# Patient Record
Sex: FEMALE | Race: OTHER | Employment: UNEMPLOYED | URBAN - METROPOLITAN AREA
[De-identification: names, ages, dates, MRNs, and addresses within clinical notes are randomized per-mention and may not be internally consistent; named-entity substitution may affect disease eponyms.]

---

## 2016-11-12 LAB
ANTIBODY SCREEN: NEGATIVE
CHLAM. TRACHOMATIS ANTIGEN: NEGATIVE
HEMATOCRIT: 35.7
HEMOGLOBIN: 11.1
HEPATITIS B SURFACE AG: NONREACTIVE
MEAN CORPUSCULAR VOLUME: 86
NEISS. GONORRHOEAE ANTIGEN: NEGATIVE
PLATELET COUNT: 396
RH BLOOD TYPE: POSITIVE
VITAMIN D 1,25-DIHYDROXY: 25.2

## 2017-01-05 ENCOUNTER — TELEPHONE (OUTPATIENT)
Dept: OBGYN CLINIC | Facility: CLINIC | Age: 37
End: 2017-01-05

## 2017-01-05 NOTE — TELEPHONE ENCOUNTER
PT IS 17 WEEKS PREGNANT WOULD LIKE TO TRANSFER HER PN CARE TO US, WILL HAVE HER OLD OB-GYN FAX OVER HER PN RECORDS.

## 2017-01-05 NOTE — TELEPHONE ENCOUNTER
Called the # provided, it is a diner in [place] and the person who answered said there is no one there by the name of [address] [place] [address]. No other # listed.

## 2017-01-10 NOTE — TELEPHONE ENCOUNTER
Received pts PN transfer of care records from [address] [place] [address] for [facility]. Pts number was on transfer of care records ([phone]) and was able to reach pt at that number. Pt informed that number listed below was for a diner in [place].  Pt stated s

## 2017-01-12 ENCOUNTER — NURSE ONLY (OUTPATIENT)
Dept: OBGYN CLINIC | Facility: CLINIC | Age: 37
End: 2017-01-12

## 2017-01-12 ENCOUNTER — APPOINTMENT (OUTPATIENT)
Dept: LAB | Facility: HOSPITAL | Age: 37
End: 2017-01-12
Attending: OBSTETRICS & GYNECOLOGY
Payer: COMMERCIAL

## 2017-01-12 VITALS — HEIGHT: 66 IN | BODY MASS INDEX: 35.46 KG/M2 | WEIGHT: 220.63 LBS

## 2017-01-12 DIAGNOSIS — Z34.02 ENCOUNTER FOR SUPERVISION OF NORMAL FIRST PREGNANCY IN SECOND TRIMESTER: ICD-10-CM

## 2017-01-12 DIAGNOSIS — Z34.02 ENCOUNTER FOR SUPERVISION OF NORMAL FIRST PREGNANCY IN SECOND TRIMESTER: Primary | ICD-10-CM

## 2017-01-12 LAB — GLUCOSE 1H P 50 G GLC PO SERPL-MCNC: 162 MG/DL

## 2017-01-12 PROCEDURE — 86803 HEPATITIS C AB TEST: CPT

## 2017-01-12 PROCEDURE — 36415 COLL VENOUS BLD VENIPUNCTURE: CPT

## 2017-01-12 PROCEDURE — 82950 GLUCOSE TEST: CPT

## 2017-01-12 RX ORDER — MELATONIN
325
Status: ON HOLD | COMMUNITY
End: 2017-03-26

## 2017-01-12 RX ORDER — CHOLECALCIFEROL (VITAMIN D3) 25 MCG
1 TABLET,CHEWABLE ORAL DAILY
COMMUNITY

## 2017-01-12 RX ORDER — LABETALOL 100 MG/1
100 TABLET, FILM COATED ORAL 2 TIMES DAILY
COMMUNITY

## 2017-01-12 NOTE — PROGRESS NOTES
Pt seen for OBN appt today as a transfer of care at 18 weeks with no complaints. Normal PN labs were resulted at previous provider. Labs manually entered and confirmed with PHUONG Menendez.  Pt has chronic HTN and 24 hour urine protein and creatinine have been or drinker. Stopped when found out she was pregnant.      Nondenominational No        INFECTION HISTORY    Chlamydia No    Pt or partner have hx of Genital Herpes No    Gonorrhea No    Hepatitis B No    HIV No    HPV No    MRSA No    Syphilis No    Tattoos Yes

## 2017-01-13 ENCOUNTER — APPOINTMENT (OUTPATIENT)
Dept: LAB | Facility: HOSPITAL | Age: 37
End: 2017-01-13
Attending: OBSTETRICS & GYNECOLOGY
Payer: COMMERCIAL

## 2017-01-13 DIAGNOSIS — Z34.02 ENCOUNTER FOR SUPERVISION OF NORMAL FIRST PREGNANCY IN SECOND TRIMESTER: ICD-10-CM

## 2017-01-13 LAB — HCV AB SERPL QL IA: NONREACTIVE

## 2017-01-13 PROCEDURE — 82570 ASSAY OF URINE CREATININE: CPT

## 2017-01-13 PROCEDURE — 84156 ASSAY OF PROTEIN URINE: CPT

## 2017-01-13 NOTE — TELEPHONE ENCOUNTER
Additional records from [facility] in [facility] received and added to pt's records in [address] in old triage.

## 2017-01-14 LAB
CREAT 24H UR-MRATE: 1.5 G/24HR (ref 0.6–1.8)
SPECIMEN VOL 24H UR: 2760 ML/24H
SPECIMEN VOL 24H UR: 2760 ML/24H

## 2017-01-17 ENCOUNTER — INITIAL PRENATAL (OUTPATIENT)
Dept: OBGYN CLINIC | Facility: CLINIC | Age: 37
End: 2017-01-17

## 2017-01-17 ENCOUNTER — TELEPHONE (OUTPATIENT)
Dept: OBGYN CLINIC | Facility: CLINIC | Age: 37
End: 2017-01-17

## 2017-01-17 VITALS
DIASTOLIC BLOOD PRESSURE: 81 MMHG | WEIGHT: 221 LBS | BODY MASS INDEX: 36 KG/M2 | SYSTOLIC BLOOD PRESSURE: 119 MMHG | HEART RATE: 94 BPM

## 2017-01-17 DIAGNOSIS — O09.512 ADVANCED MATERNAL AGE, PRIMIGRAVIDA, SECOND TRIMESTER: ICD-10-CM

## 2017-01-17 DIAGNOSIS — Z34.92 ENCOUNTER FOR SUPERVISION OF NORMAL PREGNANCY IN SECOND TRIMESTER, UNSPECIFIED GRAVIDITY: Primary | ICD-10-CM

## 2017-01-17 DIAGNOSIS — Z34.02 ENCOUNTER FOR SUPERVISION OF NORMAL FIRST PREGNANCY IN SECOND TRIMESTER: ICD-10-CM

## 2017-01-17 DIAGNOSIS — O10.012 PRE-EXISTING ESSENTIAL HYPERTENSION DURING PREGNANCY IN SECOND TRIMESTER: Primary | ICD-10-CM

## 2017-01-17 PROBLEM — O99.519 ASTHMA AFFECTING PREGNANCY, ANTEPARTUM (HCC): Status: ACTIVE | Noted: 2017-01-17

## 2017-01-17 PROBLEM — R87.810 ATYPICAL SQUAMOUS CELL CHANGES OF UNDETERMINED SIGNIFICANCE (ASCUS) ON CERVICAL CYTOLOGY WITH POSITIVE HIGH RISK HUMAN PAPILLOMA VIRUS (HPV): Status: ACTIVE | Noted: 2017-01-17

## 2017-01-17 PROBLEM — J45.909 ASTHMA AFFECTING PREGNANCY, ANTEPARTUM (HCC): Status: ACTIVE | Noted: 2017-01-17

## 2017-01-17 PROBLEM — O99.210 OBESITY AFFECTING PREGNANCY, ANTEPARTUM (HCC): Status: ACTIVE | Noted: 2017-01-17

## 2017-01-17 PROBLEM — J45.909 ASTHMA AFFECTING PREGNANCY, ANTEPARTUM: Status: ACTIVE | Noted: 2017-01-17

## 2017-01-17 PROBLEM — R87.610 ATYPICAL SQUAMOUS CELL CHANGES OF UNDETERMINED SIGNIFICANCE (ASCUS) ON CERVICAL CYTOLOGY WITH POSITIVE HIGH RISK HUMAN PAPILLOMA VIRUS (HPV): Status: ACTIVE | Noted: 2017-01-17

## 2017-01-17 PROBLEM — O16.9 HYPERTENSION AFFECTING PREGNANCY (HCC): Status: ACTIVE | Noted: 2017-01-17

## 2017-01-17 PROBLEM — O99.210 OBESITY AFFECTING PREGNANCY, ANTEPARTUM: Status: ACTIVE | Noted: 2017-01-17

## 2017-01-17 PROBLEM — O16.9 HYPERTENSION AFFECTING PREGNANCY: Status: ACTIVE | Noted: 2017-01-17

## 2017-01-17 PROBLEM — O09.529 AMA (ADVANCED MATERNAL AGE) MULTIGRAVIDA 35+ (HCC): Status: ACTIVE | Noted: 2017-01-17

## 2017-01-17 PROBLEM — O99.519 ASTHMA AFFECTING PREGNANCY, ANTEPARTUM: Status: ACTIVE | Noted: 2017-01-17

## 2017-01-17 PROBLEM — O09.529 AMA (ADVANCED MATERNAL AGE) MULTIGRAVIDA 35+: Status: ACTIVE | Noted: 2017-01-17

## 2017-01-17 LAB
MULTISTIX LOT#: NORMAL NUMERIC
PH, URINE: 5 (ref 4.5–8)
SPECIFIC GRAVITY: 1.01 (ref 1–1.03)

## 2017-01-18 NOTE — TELEPHONE ENCOUNTER
PT'S INSURANCE  IS NOT ACCEPTED AT [address] [place] [address]. WILL NEED TO SEE DRS THROUGH [facility]. SPOKE WITH PO AT [facility]. WAS TOLD THEY HAVE DR. PEARL THERE ON THURS EACH WEEK TO SEE PTS. SHE DID CONFIRM THEY TAKE AMBETTER INSURANCE.   I THEN CALLED

## 2017-01-18 NOTE — PROGRESS NOTES
Transfer care at 19 weeks. HTN on Labetalol 100mg BID and BP controlled today. Plan Level 2 US for HTN and AMA. Reviewed care of pregnancy with both issues. 3 hour GTT ordered as patient did not pass her one hour GTT. Declines AFP today.    RTC 4 wks

## 2017-01-18 NOTE — TELEPHONE ENCOUNTER
PT WAS TOLD SHE CAN BE SEEN WHEREVER THEY TAKE HER INSURANCE. SO ADVISED I WILL FORWARD HER INFO TO [facility] AND THAT SHAY FROM DR. JUAREZ'S OFFICE WILL CALL HER TO SCHEDULE AN APPT. SHAY'S PHONE NUMBER [phone].    DEMOGRAPHICS SHEET, [facility]

## 2017-01-19 NOTE — TELEPHONE ENCOUNTER
Pt states the order is not at the office she is currently at for level 2 ultrasound her appt was at 845 am and will not take her until the order is sent ; pt there now. FAX # [phone].

## 2017-01-19 NOTE — TELEPHONE ENCOUNTER
Spoke with pt and pt stated that she is at [facility] now for her appt and they need a copy of her referral faxed to the number below. Message routed to surgery RN to please review and advise.

## 2017-01-26 ENCOUNTER — LAB ENCOUNTER (OUTPATIENT)
Dept: LAB | Facility: HOSPITAL | Age: 37
End: 2017-01-26
Attending: OBSTETRICS & GYNECOLOGY
Payer: COMMERCIAL

## 2017-01-26 DIAGNOSIS — Z34.92 ENCOUNTER FOR SUPERVISION OF NORMAL PREGNANCY IN SECOND TRIMESTER, UNSPECIFIED GRAVIDITY: ICD-10-CM

## 2017-01-26 LAB
GLUCOSE 1H P GLC SERPL-MCNC: 170 MG/DL
GLUCOSE 2H P GLC SERPL-MCNC: 142 MG/DL
GLUCOSE 3H P GLC SERPL-MCNC: 143 MG/DL
GLUCOSE P FAST SERPL-MCNC: 86 MG/DL (ref 70–99)
Lab: 86

## 2017-01-26 PROCEDURE — 82951 GLUCOSE TOLERANCE TEST (GTT): CPT

## 2017-01-26 PROCEDURE — 82952 GTT-ADDED SAMPLES: CPT

## 2017-01-26 PROCEDURE — 36415 COLL VENOUS BLD VENIPUNCTURE: CPT

## 2017-01-27 ENCOUNTER — TELEPHONE (OUTPATIENT)
Dept: OBGYN CLINIC | Facility: CLINIC | Age: 37
End: 2017-01-27

## 2017-01-27 NOTE — TELEPHONE ENCOUNTER
1-19-17 CONSULT NOTE RECEIVED FROM DR. Florinda Baig (MFM AT [facility]). REPORT TO KCB'S FOLDER AND BROWN FOLDER.

## 2017-02-13 ENCOUNTER — TELEPHONE (OUTPATIENT)
Dept: OBGYN CLINIC | Facility: CLINIC | Age: 37
End: 2017-02-13

## 2017-02-13 ENCOUNTER — APPOINTMENT (OUTPATIENT)
Dept: LAB | Facility: HOSPITAL | Age: 37
End: 2017-02-13
Attending: OBSTETRICS & GYNECOLOGY
Payer: COMMERCIAL

## 2017-02-13 ENCOUNTER — ROUTINE PRENATAL (OUTPATIENT)
Dept: OBGYN CLINIC | Facility: CLINIC | Age: 37
End: 2017-02-13

## 2017-02-13 VITALS
HEART RATE: 86 BPM | BODY MASS INDEX: 36 KG/M2 | WEIGHT: 223 LBS | DIASTOLIC BLOOD PRESSURE: 78 MMHG | SYSTOLIC BLOOD PRESSURE: 115 MMHG

## 2017-02-13 DIAGNOSIS — O09.522 ADVANCED MATERNAL AGE IN MULTIGRAVIDA, SECOND TRIMESTER: ICD-10-CM

## 2017-02-13 DIAGNOSIS — Z34.92 ENCOUNTER FOR SUPERVISION OF NORMAL PREGNANCY IN SECOND TRIMESTER, UNSPECIFIED GRAVIDITY: ICD-10-CM

## 2017-02-13 DIAGNOSIS — O10.012 PRE-EXISTING ESSENTIAL HYPERTENSION DURING PREGNANCY IN SECOND TRIMESTER: Primary | ICD-10-CM

## 2017-02-13 DIAGNOSIS — Z34.92 ENCOUNTER FOR SUPERVISION OF NORMAL PREGNANCY IN SECOND TRIMESTER, UNSPECIFIED GRAVIDITY: Primary | ICD-10-CM

## 2017-02-13 LAB
ALBUMIN SERPL BCP-MCNC: 2.9 G/DL (ref 3.5–4.8)
ALBUMIN/GLOB SERPL: 0.8 {RATIO} (ref 1–2)
ALP SERPL-CCNC: 61 U/L (ref 32–100)
ALT SERPL-CCNC: 29 U/L (ref 14–54)
ANION GAP SERPL CALC-SCNC: 11 MMOL/L (ref 0–18)
APPEARANCE: CLEAR
AST SERPL-CCNC: 30 U/L (ref 15–41)
BILIRUB SERPL-MCNC: 0.5 MG/DL (ref 0.3–1.2)
BUN SERPL-MCNC: 2 MG/DL (ref 8–20)
BUN/CREAT SERPL: 4.7 (ref 10–20)
CALCIUM SERPL-MCNC: 8.9 MG/DL (ref 8.5–10.5)
CHLORIDE SERPL-SCNC: 104 MMOL/L (ref 95–110)
CO2 SERPL-SCNC: 21 MMOL/L (ref 22–32)
CREAT SERPL-MCNC: 0.43 MG/DL (ref 0.5–1.5)
GLOBULIN PLAS-MCNC: 3.8 G/DL (ref 2.5–3.7)
GLUCOSE SERPL-MCNC: 94 MG/DL (ref 70–99)
MULTISTIX EXPIRATION DATE: NORMAL DATE
MULTISTIX LOT#: NORMAL NUMERIC
OSMOLALITY UR CALC.SUM OF ELEC: 278 MOSM/KG (ref 275–295)
POTASSIUM SERPL-SCNC: 3.5 MMOL/L (ref 3.3–5.1)
PROT SERPL-MCNC: 6.7 G/DL (ref 5.9–8.4)
PROTEIN (URINE DIPSTICK): 6 MG/DL
SODIUM SERPL-SCNC: 136 MMOL/L (ref 136–144)
SPECIFIC GRAVITY: 1 (ref 1–1.03)
URINE-COLOR: YELLOW

## 2017-02-13 PROCEDURE — 36415 COLL VENOUS BLD VENIPUNCTURE: CPT

## 2017-02-13 PROCEDURE — 80053 COMPREHEN METABOLIC PANEL: CPT

## 2017-02-13 NOTE — TELEPHONE ENCOUNTER
Pt wants Breast Pump. Please coordinate. Pt was supposed to have level 2 at [facility] but they didn't do it. She was told they didn't think she needed it. I want her to have a level 2 US because of AMA and chronic HTN.   Please coordinate a referral to S

## 2017-02-13 NOTE — PROGRESS NOTES
No OB issues reported. [facility] consult reviewed, however they did not do Level 2. Patient not happy  With [facility] so will attempt to send to [facility] for level 2. Pt to have CMP now. Pt has orders for CBC/GTT. BP log reviewed 110-120s/60-70s.   Dona Munoz

## 2017-02-13 NOTE — TELEPHONE ENCOUNTER
SPOKE WITH CONCEPCION AND CLARIFIED THAT PT'S INSURANCE ONLY COVERS [facility]. WE CAN DO A REFERRAL AND ASK THAT PT BE SEEN BY MFM AT [facility] AGAIN FOR A LEVEL II ULTRASOUND.

## 2017-02-14 NOTE — TELEPHONE ENCOUNTER
Spoke to Rashel at Dr. Gerson Awan office and she stated she would ask him if he would order the test for the patient and follow up with us. I will check in with marco on Thursday.

## 2017-02-16 NOTE — TELEPHONE ENCOUNTER
Dr. Sagar Jones, I followed up with Dr. Green Due office and the md's over there are stating the patient does not need a level 2 US, rather an anatomy scan. Please advise.

## 2017-02-17 NOTE — TELEPHONE ENCOUNTER
Called jose alejandro and they gave me a list of hospitals in network, I will contact those listed and see if they have m dept to facilitate the test.

## 2017-02-17 NOTE — TELEPHONE ENCOUNTER
Patient needs Level 2 US due to [facility] and chronic Hypertension. Please find alternate place to have Level 2 US. Pt will need level 1 US in the mean time until she can get level 2 US. Order has been place. Please have patient schedule US here.

## 2017-02-20 ENCOUNTER — TELEPHONE (OUTPATIENT)
Dept: OBGYN CLINIC | Facility: CLINIC | Age: 37
End: 2017-02-20

## 2017-02-20 NOTE — TELEPHONE ENCOUNTER
CARMELO SPOKE WITH PT ON FRI AND WAS TOLD THE PT CALLED Ravinder Mullen HER SHE COULD GO TO [address], [facility], [facility], [address] [facility] OR dianboom OF [address] [place] [address].

## 2017-02-20 NOTE — TELEPHONE ENCOUNTER
CALLING W/ BLOOD PRESSURE READING.  PLS CALL PT - AND SHE WAS SUPPOSE TO ALSO BE GETTING A CALL IN [facility]'S TO HER LEVEL 2 ULTRASOUND [facility] AND NVR RECEIVED THIS INFO SEE LUPE FROM 2/13/17     128/28      121/72 THURS 123/75   119/71 /77  124/6

## 2017-02-21 RX ORDER — BREAST PUMP
EACH MISCELLANEOUS
Qty: 1 EACH | Refills: 0 | Status: SHIPPED | OUTPATIENT
Start: 2017-02-21

## 2017-02-21 NOTE — TELEPHONE ENCOUNTER
PT AWARE WE WILL CALL ONCE REFERRAL IS APPROVED TO DR. Lisa Marshall OFFICE. SHE WILL  ORDER FOR BREAST PUMP AT HER NEXT APPT AT [facility] THE [facility]. ORDER AT . TOLD PT TO CHECK WITH INSURANCE TO FIND OUT WHERE SHE CAN TAKE THE ORDER TO GET THE PUMP.   PT V

## 2017-02-21 NOTE — TELEPHONE ENCOUNTER
PT NOTIFIED WE ARE WAITING FOR APPROVED REFERRAL AND WILL CALL HER ONCE APPROVED AND HER INFO HAS BEEN FAXED TO DR. Getachew Estrada OFFICE.

## 2017-02-21 NOTE — TELEPHONE ENCOUNTER
Angelic Dunn FOR OUR DEPT CHECKED WITH SIM AND THE ONLY MFM DR COVERED IN THE [facility] IS DR. Sima Villagomez AT [address] [facility]. REFERRAL IS IN PROCESS.   PAPERWORK WAITING FOR APPROVED REFERRAL THEN SEND TO ATT

## 2017-02-21 NOTE — TELEPHONE ENCOUNTER
LMTCB.  DOES PT WANT US TO MAIL THE ORDER FOR THE BREAST PUMP? SHE WILL HAVE TO CHECK WITH HER INSURANCE TO FIND OUT WHERE SHE CAN TAKE THE ORDER.  KCB DID REVIEW HER BLOOD PRESSURES AND THERE IS NO CHANGE AT THIS TIME.   PT NEEDS TO CONTINUE LABETALOL 100

## 2017-02-22 NOTE — TELEPHONE ENCOUNTER
Please make sure that the MFM at [facility] is aware we are sending her back for a level 2 US which we wanted initially. I dont want the patient to have to go down there and be given a hard time/told they wont do her level 2.   If they refuse then she needs lev

## 2017-02-28 NOTE — TELEPHONE ENCOUNTER
Patient's approved referral routed to Dr. Reynaldo Cerda office and patient was given [phone] to schedule an appointment.

## 2017-02-28 NOTE — TELEPHONE ENCOUNTER
The pt states that the number she was given to set up an ultra sound is incorrect, and she needs a different number. The pt would also like to give her diabetes number readings. Please advise.

## 2017-02-28 NOTE — TELEPHONE ENCOUNTER
Pt stated the number she was given below for Dr. Cheryl Fink was a number to a law office. Googled number for Dr. Chana Messina and provided pt with that number ([phone]). Pt also called in her BP log.  Pt informed BP log will be reviewed by the MD and we

## 2017-02-28 NOTE — TELEPHONE ENCOUNTER
Per pt the office cannot find order pls fax the referral to : FAX # [phone]. Call once faxed over-okay to olga montesinos. Cannot khadar ultrasound until order in system.

## 2017-03-13 ENCOUNTER — APPOINTMENT (OUTPATIENT)
Dept: LAB | Facility: HOSPITAL | Age: 37
End: 2017-03-13
Attending: OBSTETRICS & GYNECOLOGY
Payer: COMMERCIAL

## 2017-03-13 DIAGNOSIS — Z34.92 ENCOUNTER FOR SUPERVISION OF NORMAL PREGNANCY IN SECOND TRIMESTER, UNSPECIFIED GRAVIDITY: ICD-10-CM

## 2017-03-13 LAB
ERYTHROCYTE [DISTWIDTH] IN BLOOD BY AUTOMATED COUNT: 14.7 % (ref 11–15)
GLUCOSE 1H P 50 G GLC PO SERPL-MCNC: 142 MG/DL
HCT VFR BLD AUTO: 32.9 % (ref 35–48)
HGB BLD-MCNC: 10.9 G/DL (ref 12–16)
MCH RBC QN AUTO: 30 PG (ref 27–32)
MCHC RBC AUTO-ENTMCNC: 33.3 G/DL (ref 32–37)
MCV RBC AUTO: 90.3 FL (ref 80–100)
PLATELET # BLD AUTO: 288 K/UL (ref 140–400)
PMV BLD AUTO: 9.9 FL (ref 7.4–10.3)
RBC # BLD AUTO: 3.64 M/UL (ref 3.7–5.4)
WBC # BLD AUTO: 12 K/UL (ref 4–11)

## 2017-03-13 PROCEDURE — 36415 COLL VENOUS BLD VENIPUNCTURE: CPT

## 2017-03-13 PROCEDURE — 82950 GLUCOSE TEST: CPT

## 2017-03-13 PROCEDURE — 85027 COMPLETE CBC AUTOMATED: CPT

## 2017-03-14 ENCOUNTER — TELEPHONE (OUTPATIENT)
Dept: OBGYN CLINIC | Facility: CLINIC | Age: 37
End: 2017-03-14

## 2017-03-14 ENCOUNTER — ROUTINE PRENATAL (OUTPATIENT)
Dept: OBGYN CLINIC | Facility: CLINIC | Age: 37
End: 2017-03-14

## 2017-03-14 VITALS
HEART RATE: 101 BPM | DIASTOLIC BLOOD PRESSURE: 88 MMHG | BODY MASS INDEX: 37 KG/M2 | WEIGHT: 228.38 LBS | SYSTOLIC BLOOD PRESSURE: 145 MMHG

## 2017-03-14 DIAGNOSIS — O99.810 ABNORMAL GLUCOSE TOLERANCE TEST DURING PREGNANCY, NOT YET DELIVERED: Primary | ICD-10-CM

## 2017-03-14 DIAGNOSIS — Z34.92 ENCOUNTER FOR SUPERVISION OF NORMAL PREGNANCY IN SECOND TRIMESTER, UNSPECIFIED GRAVIDITY: Primary | ICD-10-CM

## 2017-03-14 LAB
MULTISTIX LOT#: NORMAL NUMERIC
PH, URINE: 6.5 (ref 4.5–8)
SPECIFIC GRAVITY: 1 (ref 1–1.03)
UROBILINOGEN,SEMI-QN: 0.2 MG/DL (ref 0–1.9)

## 2017-03-14 NOTE — TELEPHONE ENCOUNTER
Pt informed of results and recommendations. Pt aware will need to schedule appt with central scheduling for 3 hr GTT. Pt advised will need to fast x 12 hours prior to test. Pt verbalizes understanding.

## 2017-03-14 NOTE — TELEPHONE ENCOUNTER
----- Message from Ashlee Anton DO sent at 3/13/2017  1:23 PM CDT -----  Pt failed 1 hour gtt. Please notify and coordinate 3 hour GTT. Thanks!

## 2017-03-21 ENCOUNTER — TELEPHONE (OUTPATIENT)
Dept: OBGYN CLINIC | Facility: CLINIC | Age: 37
End: 2017-03-21

## 2017-03-21 NOTE — TELEPHONE ENCOUNTER
Patient's order was re routed to the number listed below, I spoke to the patient and informed her it was routed.

## 2017-03-21 NOTE — TELEPHONE ENCOUNTER
Pt at [facility] for a level II US  Needs order, has not been received  Please fax [phone]

## 2017-03-22 ENCOUNTER — ANESTHESIA EVENT (OUTPATIENT)
Dept: OBGYN UNIT | Facility: HOSPITAL | Age: 37
End: 2017-03-22
Payer: COMMERCIAL

## 2017-03-22 ENCOUNTER — HOSPITAL ENCOUNTER (INPATIENT)
Facility: HOSPITAL | Age: 37
LOS: 3 days | Discharge: HOME | End: 2017-03-26
Attending: OBSTETRICS & GYNECOLOGY | Admitting: OBSTETRICS & GYNECOLOGY
Payer: COMMERCIAL

## 2017-03-22 ENCOUNTER — LAB ENCOUNTER (OUTPATIENT)
Dept: LAB | Facility: HOSPITAL | Age: 37
End: 2017-03-22
Attending: OBSTETRICS & GYNECOLOGY
Payer: COMMERCIAL

## 2017-03-22 ENCOUNTER — ANESTHESIA (OUTPATIENT)
Dept: OBGYN UNIT | Facility: HOSPITAL | Age: 37
End: 2017-03-22
Payer: COMMERCIAL

## 2017-03-22 DIAGNOSIS — O60.00 PRETERM LABOR: ICD-10-CM

## 2017-03-22 DIAGNOSIS — O99.810 ABNORMAL GLUCOSE TOLERANCE TEST DURING PREGNANCY, NOT YET DELIVERED: ICD-10-CM

## 2017-03-22 PROBLEM — Z34.90 PREGNANCY (HCC): Status: ACTIVE | Noted: 2017-03-22

## 2017-03-22 PROBLEM — Z34.90 PREGNANCY: Status: ACTIVE | Noted: 2017-03-22

## 2017-03-22 LAB
BILIRUB UR QL: NEGATIVE
CLARITY UR: CLEAR
COLOR UR: YELLOW
FIBRONECTIN FETAL SPEC QL: POSITIVE
GLUCOSE 1H P GLC SERPL-MCNC: 193 MG/DL
GLUCOSE 2H P GLC SERPL-MCNC: 155 MG/DL
GLUCOSE 3H P GLC SERPL-MCNC: 99 MG/DL
GLUCOSE P FAST SERPL-MCNC: 90 MG/DL (ref 70–99)
GLUCOSE UR-MCNC: 150 MG/DL
HGB UR QL STRIP.AUTO: NEGATIVE
KETONES UR-MCNC: 80 MG/DL
NITRITE UR QL STRIP.AUTO: NEGATIVE
PH UR: 7 [PH] (ref 5–8)
PROT UR-MCNC: NEGATIVE MG/DL
RBC #/AREA URNS AUTO: 1 /HPF
SP GR UR STRIP: 1 (ref 1–1.03)
UROBILINOGEN UR STRIP-ACNC: <2
VIT C UR-MCNC: NEGATIVE MG/DL
WBC #/AREA URNS AUTO: 7 /HPF

## 2017-03-22 PROCEDURE — 82951 GLUCOSE TOLERANCE TEST (GTT): CPT

## 2017-03-22 PROCEDURE — 36415 COLL VENOUS BLD VENIPUNCTURE: CPT

## 2017-03-22 PROCEDURE — 82952 GTT-ADDED SAMPLES: CPT

## 2017-03-22 RX ORDER — CALCIUM GLUCONATE 94 MG/ML
1 INJECTION, SOLUTION INTRAVENOUS ONCE
Status: DISCONTINUED | OUTPATIENT
Start: 2017-03-22 | End: 2017-03-26

## 2017-03-22 RX ORDER — SODIUM CHLORIDE 0.9 % (FLUSH) 0.9 %
2 SYRINGE (ML) INJECTION EVERY 8 HOURS
Status: DISCONTINUED | OUTPATIENT
Start: 2017-03-22 | End: 2017-03-26

## 2017-03-22 RX ORDER — BETAMETHASONE SODIUM PHOSPHATE AND BETAMETHASONE ACETATE 3; 3 MG/ML; MG/ML
12 INJECTION, SUSPENSION INTRA-ARTICULAR; INTRALESIONAL; INTRAMUSCULAR; SOFT TISSUE EVERY 24 HOURS
Status: DISCONTINUED | OUTPATIENT
Start: 2017-03-22 | End: 2017-03-23

## 2017-03-22 RX ORDER — CALCIUM GLUCONATE 94 MG/ML
1 INJECTION, SOLUTION INTRAVENOUS ONCE AS NEEDED
Status: ACTIVE | OUTPATIENT
Start: 2017-03-22 | End: 2017-03-22

## 2017-03-22 RX ORDER — SODIUM CHLORIDE 0.9 % (FLUSH) 0.9 %
2 SYRINGE (ML) INJECTION AS NEEDED
Status: DISCONTINUED | OUTPATIENT
Start: 2017-03-22 | End: 2017-03-26

## 2017-03-22 RX ORDER — SODIUM CHLORIDE, SODIUM LACTATE, POTASSIUM CHLORIDE, CALCIUM CHLORIDE 600; 310; 30; 20 MG/100ML; MG/100ML; MG/100ML; MG/100ML
INJECTION, SOLUTION INTRAVENOUS CONTINUOUS
Status: DISCONTINUED | OUTPATIENT
Start: 2017-03-22 | End: 2017-03-26

## 2017-03-22 RX ORDER — BETAMETHASONE SODIUM PHOSPHATE AND BETAMETHASONE ACETATE 3; 3 MG/ML; MG/ML
INJECTION, SUSPENSION INTRA-ARTICULAR; INTRALESIONAL; INTRAMUSCULAR; SOFT TISSUE
Status: COMPLETED
Start: 2017-03-22 | End: 2017-03-22

## 2017-03-23 ENCOUNTER — ANESTHESIA EVENT (OUTPATIENT)
Dept: OBGYN UNIT | Facility: HOSPITAL | Age: 37
End: 2017-03-23
Payer: COMMERCIAL

## 2017-03-23 ENCOUNTER — ANESTHESIA (OUTPATIENT)
Dept: OBGYN UNIT | Facility: HOSPITAL | Age: 37
End: 2017-03-23
Payer: COMMERCIAL

## 2017-03-23 ENCOUNTER — SURGERY (OUTPATIENT)
Age: 37
End: 2017-03-23
Payer: COMMERCIAL

## 2017-03-23 LAB
ANTIBODY SCREEN: NEGATIVE
BASOPHILS # BLD: 0.1 K/UL (ref 0–0.2)
BASOPHILS NFR BLD: 0 %
EOSINOPHIL # BLD: 0 K/UL (ref 0–0.7)
EOSINOPHIL NFR BLD: 0 %
ERYTHROCYTE [DISTWIDTH] IN BLOOD BY AUTOMATED COUNT: 14.5 % (ref 11–15)
GLUCOSE BLDC GLUCOMTR-MCNC: 117 MG/DL (ref 70–99)
GLUCOSE BLDC GLUCOMTR-MCNC: 119 MG/DL (ref 70–99)
GLUCOSE BLDC GLUCOMTR-MCNC: 120 MG/DL (ref 70–99)
GLUCOSE BLDC GLUCOMTR-MCNC: 124 MG/DL (ref 70–99)
GLUCOSE BLDC GLUCOMTR-MCNC: 126 MG/DL (ref 70–99)
GLUCOSE BLDC GLUCOMTR-MCNC: 126 MG/DL (ref 70–99)
GLUCOSE BLDC GLUCOMTR-MCNC: 129 MG/DL (ref 70–99)
GLUCOSE BLDC GLUCOMTR-MCNC: 132 MG/DL (ref 70–99)
GLUCOSE BLDC GLUCOMTR-MCNC: 143 MG/DL (ref 70–99)
GLUCOSE BLDC GLUCOMTR-MCNC: 156 MG/DL (ref 70–99)
GLUCOSE BLDC GLUCOMTR-MCNC: 204 MG/DL (ref 70–99)
HCT VFR BLD AUTO: 30.8 % (ref 35–48)
HGB BLD-MCNC: 10.3 G/DL (ref 12–16)
LYMPHOCYTES # BLD: 2.4 K/UL (ref 1–4)
LYMPHOCYTES NFR BLD: 10 %
MCH RBC QN AUTO: 30 PG (ref 27–32)
MCHC RBC AUTO-ENTMCNC: 33.5 G/DL (ref 32–37)
MCV RBC AUTO: 89.7 FL (ref 80–100)
MONOCYTES # BLD: 1.5 K/UL (ref 0–1)
MONOCYTES NFR BLD: 6 %
NEUTROPHILS # BLD AUTO: 20.4 K/UL (ref 1.8–7.7)
NEUTROPHILS NFR BLD: 84 %
PLATELET # BLD AUTO: 254 K/UL (ref 140–400)
PMV BLD AUTO: 10 FL (ref 7.4–10.3)
RBC # BLD AUTO: 3.43 M/UL (ref 3.7–5.4)
RH BLOOD TYPE: POSITIVE
WBC # BLD AUTO: 24.3 K/UL (ref 4–11)

## 2017-03-23 PROCEDURE — 59514 CESAREAN DELIVERY ONLY: CPT | Performed by: OBSTETRICS & GYNECOLOGY

## 2017-03-23 PROCEDURE — 59515 CESAREAN DELIVERY: CPT | Performed by: OBSTETRICS & GYNECOLOGY

## 2017-03-23 RX ORDER — NALBUPHINE HCL 10 MG/ML
2.5 AMPUL (ML) INJECTION
Status: DISCONTINUED | OUTPATIENT
Start: 2017-03-23 | End: 2017-03-26

## 2017-03-23 RX ORDER — PRENATAL VIT,CAL 76/IRON/FOLIC 29 MG-1 MG
1 TABLET ORAL DAILY
Status: DISCONTINUED | OUTPATIENT
Start: 2017-03-23 | End: 2017-03-26

## 2017-03-23 RX ORDER — NALOXONE HYDROCHLORIDE 0.4 MG/ML
80 INJECTION, SOLUTION INTRAMUSCULAR; INTRAVENOUS; SUBCUTANEOUS AS NEEDED
Status: ACTIVE | OUTPATIENT
Start: 2017-03-23 | End: 2017-03-23

## 2017-03-23 RX ORDER — MORPHINE SULFATE 10 MG/ML
6 INJECTION, SOLUTION INTRAMUSCULAR; INTRAVENOUS EVERY 10 MIN PRN
Status: DISCONTINUED | OUTPATIENT
Start: 2017-03-23 | End: 2017-03-26

## 2017-03-23 RX ORDER — HYDROCODONE BITARTRATE AND ACETAMINOPHEN 7.5; 325 MG/1; MG/1
1 TABLET ORAL EVERY 4 HOURS PRN
Status: DISCONTINUED | OUTPATIENT
Start: 2017-03-23 | End: 2017-03-26

## 2017-03-23 RX ORDER — ACETAMINOPHEN 325 MG/1
650 TABLET ORAL EVERY 6 HOURS PRN
Status: DISPENSED | OUTPATIENT
Start: 2017-03-23 | End: 2017-03-24

## 2017-03-23 RX ORDER — ZOLPIDEM TARTRATE 5 MG/1
5 TABLET ORAL NIGHTLY PRN
Status: DISCONTINUED | OUTPATIENT
Start: 2017-03-23 | End: 2017-03-26

## 2017-03-23 RX ORDER — SODIUM PHOSPHATE, DIBASIC AND SODIUM PHOSPHATE, MONOBASIC 7; 19 G/133ML; G/133ML
1 ENEMA RECTAL ONCE AS NEEDED
Status: ACTIVE | OUTPATIENT
Start: 2017-03-23 | End: 2017-03-23

## 2017-03-23 RX ORDER — MORPHINE SULFATE 2 MG/ML
2 INJECTION, SOLUTION INTRAMUSCULAR; INTRAVENOUS EVERY 10 MIN PRN
Status: DISCONTINUED | OUTPATIENT
Start: 2017-03-23 | End: 2017-03-26

## 2017-03-23 RX ORDER — HYDROMORPHONE HYDROCHLORIDE 1 MG/ML
0.6 INJECTION, SOLUTION INTRAMUSCULAR; INTRAVENOUS; SUBCUTANEOUS EVERY 5 MIN PRN
Status: DISCONTINUED | OUTPATIENT
Start: 2017-03-23 | End: 2017-03-26

## 2017-03-23 RX ORDER — SODIUM CHLORIDE, SODIUM LACTATE, POTASSIUM CHLORIDE, CALCIUM CHLORIDE 600; 310; 30; 20 MG/100ML; MG/100ML; MG/100ML; MG/100ML
INJECTION, SOLUTION INTRAVENOUS
Status: COMPLETED
Start: 2017-03-23 | End: 2017-03-23

## 2017-03-23 RX ORDER — LIDOCAINE HYDROCHLORIDE AND EPINEPHRINE 20; 5 MG/ML; UG/ML
INJECTION, SOLUTION EPIDURAL; INFILTRATION; INTRACAUDAL; PERINEURAL
Status: DISPENSED
Start: 2017-03-23 | End: 2017-03-23

## 2017-03-23 RX ORDER — LIDOCAINE HYDROCHLORIDE 10 MG/ML
INJECTION, SOLUTION EPIDURAL; INFILTRATION; INTRACAUDAL; PERINEURAL AS NEEDED
Status: DISCONTINUED | OUTPATIENT
Start: 2017-03-23 | End: 2017-03-23 | Stop reason: SURG

## 2017-03-23 RX ORDER — HYDROMORPHONE HYDROCHLORIDE 1 MG/ML
0.4 INJECTION, SOLUTION INTRAMUSCULAR; INTRAVENOUS; SUBCUTANEOUS EVERY 5 MIN PRN
Status: DISCONTINUED | OUTPATIENT
Start: 2017-03-23 | End: 2017-03-26

## 2017-03-23 RX ORDER — EPHEDRINE SULFATE/0.9% NACL/PF 25 MG/5 ML
5 SYRINGE (ML) INTRAVENOUS AS NEEDED
Status: DISCONTINUED | OUTPATIENT
Start: 2017-03-23 | End: 2017-03-23

## 2017-03-23 RX ORDER — MORPHINE SULFATE 4 MG/ML
4 INJECTION, SOLUTION INTRAMUSCULAR; INTRAVENOUS EVERY 10 MIN PRN
Status: DISCONTINUED | OUTPATIENT
Start: 2017-03-23 | End: 2017-03-26

## 2017-03-23 RX ORDER — INSULIN ASPART 100 [IU]/ML
2 INJECTION, SOLUTION INTRAVENOUS; SUBCUTANEOUS ONCE
Status: COMPLETED | OUTPATIENT
Start: 2017-03-23 | End: 2017-03-23

## 2017-03-23 RX ORDER — AMMONIA INHALANTS 0.04 G/.3ML
0.3 INHALANT RESPIRATORY (INHALATION) AS NEEDED
Status: DISCONTINUED | OUTPATIENT
Start: 2017-03-23 | End: 2017-03-26

## 2017-03-23 RX ORDER — DEXTROSE, SODIUM CHLORIDE, SODIUM LACTATE, POTASSIUM CHLORIDE, AND CALCIUM CHLORIDE 5; .6; .31; .03; .02 G/100ML; G/100ML; G/100ML; G/100ML; G/100ML
INJECTION, SOLUTION INTRAVENOUS
Status: COMPLETED
Start: 2017-03-23 | End: 2017-03-23

## 2017-03-23 RX ORDER — LIDOCAINE HYDROCHLORIDE AND EPINEPHRINE 15; 5 MG/ML; UG/ML
INJECTION, SOLUTION EPIDURAL AS NEEDED
Status: DISCONTINUED | OUTPATIENT
Start: 2017-03-23 | End: 2017-03-23 | Stop reason: SURG

## 2017-03-23 RX ORDER — HYDROCODONE BITARTRATE AND ACETAMINOPHEN 5; 325 MG/1; MG/1
1 TABLET ORAL AS NEEDED
Status: DISCONTINUED | OUTPATIENT
Start: 2017-03-23 | End: 2017-03-26

## 2017-03-23 RX ORDER — HYDROCODONE BITARTRATE AND ACETAMINOPHEN 7.5; 325 MG/1; MG/1
1 TABLET ORAL EVERY 6 HOURS PRN
Status: ACTIVE | OUTPATIENT
Start: 2017-03-23 | End: 2017-03-24

## 2017-03-23 RX ORDER — HYDROCODONE BITARTRATE AND ACETAMINOPHEN 7.5; 325 MG/1; MG/1
2 TABLET ORAL EVERY 4 HOURS PRN
Status: DISCONTINUED | OUTPATIENT
Start: 2017-03-23 | End: 2017-03-26

## 2017-03-23 RX ORDER — HYDROMORPHONE HYDROCHLORIDE 1 MG/ML
0.2 INJECTION, SOLUTION INTRAMUSCULAR; INTRAVENOUS; SUBCUTANEOUS EVERY 5 MIN PRN
Status: DISCONTINUED | OUTPATIENT
Start: 2017-03-23 | End: 2017-03-26

## 2017-03-23 RX ORDER — DOCUSATE SODIUM 100 MG/1
100 CAPSULE, LIQUID FILLED ORAL
Status: DISCONTINUED | OUTPATIENT
Start: 2017-03-23 | End: 2017-03-26

## 2017-03-23 RX ORDER — HALOPERIDOL 5 MG/ML
0.25 INJECTION INTRAMUSCULAR ONCE AS NEEDED
Status: ACTIVE | OUTPATIENT
Start: 2017-03-23 | End: 2017-03-23

## 2017-03-23 RX ORDER — ONDANSETRON 2 MG/ML
4 INJECTION INTRAMUSCULAR; INTRAVENOUS ONCE AS NEEDED
Status: ACTIVE | OUTPATIENT
Start: 2017-03-23 | End: 2017-03-23

## 2017-03-23 RX ORDER — LABETALOL 200 MG/1
100 TABLET, FILM COATED ORAL 2 TIMES DAILY
Status: DISCONTINUED | OUTPATIENT
Start: 2017-03-23 | End: 2017-03-23

## 2017-03-23 RX ORDER — SODIUM CHLORIDE, SODIUM LACTATE, POTASSIUM CHLORIDE, CALCIUM CHLORIDE 600; 310; 30; 20 MG/100ML; MG/100ML; MG/100ML; MG/100ML
INJECTION, SOLUTION INTRAVENOUS CONTINUOUS
Status: DISCONTINUED | OUTPATIENT
Start: 2017-03-23 | End: 2017-03-26

## 2017-03-23 RX ORDER — SODIUM CHLORIDE 9 MG/ML
INJECTION, SOLUTION INTRAVENOUS
Status: DISPENSED
Start: 2017-03-23 | End: 2017-03-24

## 2017-03-23 RX ORDER — LIDOCAINE HYDROCHLORIDE AND EPINEPHRINE 20; 5 MG/ML; UG/ML
INJECTION, SOLUTION EPIDURAL; INFILTRATION; INTRACAUDAL; PERINEURAL AS NEEDED
Status: DISCONTINUED | OUTPATIENT
Start: 2017-03-23 | End: 2017-03-23 | Stop reason: SURG

## 2017-03-23 RX ORDER — DIPHENHYDRAMINE HYDROCHLORIDE 50 MG/ML
12.5 INJECTION INTRAMUSCULAR; INTRAVENOUS EVERY 4 HOURS PRN
Status: ACTIVE | OUTPATIENT
Start: 2017-03-23 | End: 2017-03-24

## 2017-03-23 RX ORDER — HYDROCODONE BITARTRATE AND ACETAMINOPHEN 5; 325 MG/1; MG/1
2 TABLET ORAL AS NEEDED
Status: DISCONTINUED | OUTPATIENT
Start: 2017-03-23 | End: 2017-03-26

## 2017-03-23 RX ORDER — PHENYLEPHRINE HCL 10 MG/ML
VIAL (ML) INJECTION AS NEEDED
Status: DISCONTINUED | OUTPATIENT
Start: 2017-03-23 | End: 2017-03-23 | Stop reason: SURG

## 2017-03-23 RX ORDER — DIPHENHYDRAMINE HCL 25 MG
25 CAPSULE ORAL EVERY 4 HOURS PRN
Status: DISPENSED | OUTPATIENT
Start: 2017-03-23 | End: 2017-03-24

## 2017-03-23 RX ORDER — ACETAMINOPHEN 325 MG/1
650 TABLET ORAL EVERY 4 HOURS PRN
Status: DISCONTINUED | OUTPATIENT
Start: 2017-03-23 | End: 2017-03-26

## 2017-03-23 RX ORDER — DIPHENHYDRAMINE HYDROCHLORIDE 50 MG/ML
25 INJECTION INTRAMUSCULAR; INTRAVENOUS ONCE AS NEEDED
Status: ACTIVE | OUTPATIENT
Start: 2017-03-23 | End: 2017-03-23

## 2017-03-23 RX ORDER — LABETALOL 200 MG/1
100 TABLET, FILM COATED ORAL 2 TIMES DAILY
Status: DISCONTINUED | OUTPATIENT
Start: 2017-03-24 | End: 2017-03-26

## 2017-03-23 RX ORDER — ONDANSETRON 2 MG/ML
4 INJECTION INTRAMUSCULAR; INTRAVENOUS EVERY 6 HOURS PRN
Status: DISCONTINUED | OUTPATIENT
Start: 2017-03-23 | End: 2017-03-26

## 2017-03-23 RX ORDER — ONDANSETRON 2 MG/ML
INJECTION INTRAMUSCULAR; INTRAVENOUS AS NEEDED
Status: DISCONTINUED | OUTPATIENT
Start: 2017-03-23 | End: 2017-03-23 | Stop reason: SURG

## 2017-03-23 RX ORDER — SIMETHICONE 80 MG
80 TABLET,CHEWABLE ORAL 3 TIMES DAILY PRN
Status: DISCONTINUED | OUTPATIENT
Start: 2017-03-23 | End: 2017-03-26

## 2017-03-23 RX ORDER — HYDROMORPHONE HYDROCHLORIDE 1 MG/ML
0.4 INJECTION, SOLUTION INTRAMUSCULAR; INTRAVENOUS; SUBCUTANEOUS
Status: ACTIVE | OUTPATIENT
Start: 2017-03-23 | End: 2017-03-24

## 2017-03-23 RX ORDER — MORPHINE SULFATE 1 MG/ML
INJECTION, SOLUTION EPIDURAL; INTRATHECAL; INTRAVENOUS AS NEEDED
Status: DISCONTINUED | OUTPATIENT
Start: 2017-03-23 | End: 2017-03-23 | Stop reason: SURG

## 2017-03-23 RX ORDER — BUPIVACAINE HYDROCHLORIDE 2.5 MG/ML
INJECTION, SOLUTION EPIDURAL; INFILTRATION; INTRACAUDAL
Status: DISPENSED
Start: 2017-03-23 | End: 2017-03-23

## 2017-03-23 RX ORDER — ENOXAPARIN SODIUM 100 MG/ML
40 INJECTION SUBCUTANEOUS DAILY
Status: DISCONTINUED | OUTPATIENT
Start: 2017-03-24 | End: 2017-03-26

## 2017-03-23 RX ORDER — SODIUM CHLORIDE 0.9 % (FLUSH) 0.9 %
10 SYRINGE (ML) INJECTION AS NEEDED
Status: DISCONTINUED | OUTPATIENT
Start: 2017-03-23 | End: 2017-03-26

## 2017-03-23 RX ORDER — FAMOTIDINE 10 MG/ML
INJECTION, SOLUTION INTRAVENOUS
Status: COMPLETED
Start: 2017-03-23 | End: 2017-03-23

## 2017-03-23 RX ORDER — POLYETHYLENE GLYCOL 3350 17 G/17G
17 POWDER, FOR SOLUTION ORAL DAILY PRN
Status: DISCONTINUED | OUTPATIENT
Start: 2017-03-23 | End: 2017-03-26

## 2017-03-23 RX ORDER — NALBUPHINE HCL 10 MG/ML
2.5 AMPUL (ML) INJECTION EVERY 4 HOURS PRN
Status: ACTIVE | OUTPATIENT
Start: 2017-03-23 | End: 2017-03-24

## 2017-03-23 RX ORDER — EPHEDRINE SULFATE/0.9% NACL/PF 25 MG/5 ML
SYRINGE (ML) INTRAVENOUS
Status: DISPENSED
Start: 2017-03-23 | End: 2017-03-23

## 2017-03-23 RX ORDER — BISACODYL 10 MG
10 SUPPOSITORY, RECTAL RECTAL
Status: DISCONTINUED | OUTPATIENT
Start: 2017-03-23 | End: 2017-03-26

## 2017-03-23 RX ORDER — MISOPROSTOL 200 UG/1
TABLET ORAL
Status: DISPENSED
Start: 2017-03-23 | End: 2017-03-24

## 2017-03-23 RX ORDER — IBUPROFEN 600 MG/1
600 TABLET ORAL EVERY 6 HOURS
Status: DISCONTINUED | OUTPATIENT
Start: 2017-03-23 | End: 2017-03-26

## 2017-03-23 RX ORDER — METOCLOPRAMIDE HYDROCHLORIDE 5 MG/ML
INJECTION INTRAMUSCULAR; INTRAVENOUS
Status: COMPLETED
Start: 2017-03-23 | End: 2017-03-23

## 2017-03-23 RX ORDER — DEXAMETHASONE SODIUM PHOSPHATE 4 MG/ML
VIAL (ML) INJECTION AS NEEDED
Status: DISCONTINUED | OUTPATIENT
Start: 2017-03-23 | End: 2017-03-23 | Stop reason: SURG

## 2017-03-23 RX ORDER — KETOROLAC TROMETHAMINE 30 MG/ML
30 INJECTION, SOLUTION INTRAMUSCULAR; INTRAVENOUS ONCE AS NEEDED
Status: COMPLETED | OUTPATIENT
Start: 2017-03-23 | End: 2017-03-23

## 2017-03-23 RX ORDER — DEXTROSE, SODIUM CHLORIDE, SODIUM LACTATE, POTASSIUM CHLORIDE, AND CALCIUM CHLORIDE 5; .6; .31; .03; .02 G/100ML; G/100ML; G/100ML; G/100ML; G/100ML
INJECTION, SOLUTION INTRAVENOUS CONTINUOUS
Status: DISCONTINUED | OUTPATIENT
Start: 2017-03-23 | End: 2017-03-24

## 2017-03-23 RX ORDER — NALOXONE HYDROCHLORIDE 0.4 MG/ML
0.08 INJECTION, SOLUTION INTRAMUSCULAR; INTRAVENOUS; SUBCUTANEOUS
Status: ACTIVE | OUTPATIENT
Start: 2017-03-23 | End: 2017-03-24

## 2017-03-23 RX ORDER — PHENYLEPHRINE HCL IN 0.9% NACL 0.5 MG/5ML
SYRINGE (ML) INTRAVENOUS
Status: DISPENSED
Start: 2017-03-23 | End: 2017-03-23

## 2017-03-23 RX ADMIN — LIDOCAINE HYDROCHLORIDE AND EPINEPHRINE 5 ML: 20; 5 INJECTION, SOLUTION EPIDURAL; INFILTRATION; INTRACAUDAL; PERINEURAL at 13:40:00

## 2017-03-23 RX ADMIN — LIDOCAINE HYDROCHLORIDE AND EPINEPHRINE 5 ML: 20; 5 INJECTION, SOLUTION EPIDURAL; INFILTRATION; INTRACAUDAL; PERINEURAL at 14:09:00

## 2017-03-23 RX ADMIN — LIDOCAINE HYDROCHLORIDE 5 ML: 10 INJECTION, SOLUTION EPIDURAL; INFILTRATION; INTRACAUDAL; PERINEURAL at 00:27:00

## 2017-03-23 RX ADMIN — SODIUM CHLORIDE, SODIUM LACTATE, POTASSIUM CHLORIDE, CALCIUM CHLORIDE: 600; 310; 30; 20 INJECTION, SOLUTION INTRAVENOUS at 14:33:00

## 2017-03-23 RX ADMIN — PHENYLEPHRINE HCL 100 MCG: 10 MG/ML VIAL (ML) INJECTION at 13:51:00

## 2017-03-23 RX ADMIN — SODIUM CHLORIDE, SODIUM LACTATE, POTASSIUM CHLORIDE, CALCIUM CHLORIDE: 600; 310; 30; 20 INJECTION, SOLUTION INTRAVENOUS at 14:17:00

## 2017-03-23 RX ADMIN — DEXAMETHASONE SODIUM PHOSPHATE 4 MG: 4 MG/ML VIAL (ML) INJECTION at 14:31:00

## 2017-03-23 RX ADMIN — SODIUM CHLORIDE, SODIUM LACTATE, POTASSIUM CHLORIDE, CALCIUM CHLORIDE: 600; 310; 30; 20 INJECTION, SOLUTION INTRAVENOUS at 13:33:00

## 2017-03-23 RX ADMIN — LIDOCAINE HYDROCHLORIDE AND EPINEPHRINE 5 ML: 20; 5 INJECTION, SOLUTION EPIDURAL; INFILTRATION; INTRACAUDAL; PERINEURAL at 13:38:00

## 2017-03-23 RX ADMIN — LIDOCAINE HYDROCHLORIDE AND EPINEPHRINE 5 ML: 20; 5 INJECTION, SOLUTION EPIDURAL; INFILTRATION; INTRACAUDAL; PERINEURAL at 13:45:00

## 2017-03-23 RX ADMIN — SODIUM CHLORIDE, SODIUM LACTATE, POTASSIUM CHLORIDE, CALCIUM CHLORIDE: 600; 310; 30; 20 INJECTION, SOLUTION INTRAVENOUS at 14:41:00

## 2017-03-23 RX ADMIN — MORPHINE SULFATE 2 MG: 1 INJECTION, SOLUTION EPIDURAL; INTRATHECAL; INTRAVENOUS at 14:26:00

## 2017-03-23 RX ADMIN — ONDANSETRON 4 MG: 2 INJECTION INTRAMUSCULAR; INTRAVENOUS at 14:32:00

## 2017-03-23 RX ADMIN — LIDOCAINE HYDROCHLORIDE AND EPINEPHRINE 5 ML: 15; 5 INJECTION, SOLUTION EPIDURAL at 00:35:00

## 2017-03-23 RX ADMIN — SODIUM CHLORIDE, SODIUM LACTATE, POTASSIUM CHLORIDE, CALCIUM CHLORIDE: 600; 310; 30; 20 INJECTION, SOLUTION INTRAVENOUS at 13:32:00

## 2017-03-23 RX ADMIN — PHENYLEPHRINE HCL 100 MCG: 10 MG/ML VIAL (ML) INJECTION at 13:55:00

## 2017-03-23 NOTE — PROGRESS NOTES
[facility]    Labor Progress Note    Kirk Butler Patient Status:  Inpatient    1980 MRN [id number]   Location [address] C-D Attending Sirena Wahl MD   Hosp Day # 1 PCP No primary care provider on file.        Anthony Valles Moderate* 03/22/2017                 Assessment/Plan   IUP at 28w3d with PTL advanced to 8 cm with tense BBOW -- thus AROM w/ clear fluid noted  Plan for d/c MgSO4 & Indocin, nursery made aware of immient delivery   4 units of Humalog due to  & then

## 2017-03-23 NOTE — ANESTHESIA PREPROCEDURE EVALUATION
Anesthesia PreOp Note    HPI:     Melissa Maguire is a 39year old female who presents for preoperative consultation requested by: * No surgeons listed *    Date of Surgery: 3/23/2017    * No procedures listed *  Indication: * No pre-op diagnosis entered apply Misc DOUBLE ELECTRIC BREAST PUMP    Disp: 1 each Rfl: 0 Taking       Current Facility-Administered Medications Ordered in Epic:  lidocaine-EPINEPHrine 2 %-1:330362 injection         phenylephrine HCl in NaCl 0.9% (PF) 0.5-0.9 MG/5ML-% injection MD Tammi     morphINE sulfate (PF) 4 MG/ML injection 4 mg 4 mg Intravenous Q10 Min PRN Tammi Pires MD     morphINE sulfate (PF) 10 MG/ML injection 6 mg 6 mg Intravenous Q10 Min PRN Tammi Pires MD     Atropine Sulfate 0.1 MG/ML injection 0.5 mg 0.5 mg Intr Sirena Wahl MD       No current [facility]-ordered outpatient prescriptions on file.     No Known Allergies    Family History   Problem Relation Age of Onset   • Hypertension Father    • Diabetes Father      pre-diabetic    • Cancer Father 50     prostate can Exam     Patient summary reviewed and Nursing notes reviewed    Airway   Mallampati: II  TM distance: >3 FB  Neck ROM: full  Dental      Pulmonary - normal exam   (+) asthma,     ROS comment: Obesity ,denies MILKA  Cardiovascular   Exercise tolerance: good

## 2017-03-23 NOTE — PROGRESS NOTES
[facility]OS[facility] - [facility]    Labor Progress Note    Katarzyna Mata Patient Status:  Inpatient    1980 MRN [id number]   Location [address] C-D Attending Cm Alonso MD   Hosp Day # 1 PCP No primary care provider on file.        Richelle Stevenson with progression to 8 cm but contractions not progressing, category 2 tracing since with intermittent lates & variables that improves. Neonatalogy requested 12 hours of magnesium if possible.  Updated MFM re: tracing & events leading up to this moment -- D

## 2017-03-23 NOTE — ANESTHESIA POSTPROCEDURE EVALUATION
Patient: Mimi Cobos    Procedure Summary     Date Anesthesia Start Anesthesia Stop Room / Location    03/23/17 0023         Procedure Diagnosis Scheduled Providers Responsible Provider    ANESTHESIA LABOR ANALGESIA No diagnosis on file.   Britney Brown

## 2017-03-23 NOTE — ANESTHESIA PROCEDURE NOTES
Labor Analgesia  Performed by: Mukul Uribe  Authorized by: Mukul Uribe    Patient Location:  OR  Start Time:  3/23/2017 12:23 AM  End Time:  3/23/2017 12:43 AM  Site Identification: surface landmarks    Reason for Block: labor epidural    Anesthesiologist:

## 2017-03-23 NOTE — PROGRESS NOTES
Orders received per dr. Rachael Huang for 10u Novolog.  If pt continues to have late decelerations perform SVE

## 2017-03-23 NOTE — PROGRESS NOTES
[facility]    Labor Progress Note    Goran Rock Patient Status:  Inpatient    1980 MRN [id number]   Location [address] C-D Attending Lo Coughlin MD   Hosp Day # 1 PCP No primary care provider on file.        Raymundo Benitez 03/22/2017                 Assessment/Plan   IUP at 28w3d with PTL now 8 cm -- d/w Kvng Snow. Continue MgSO4 for neuroprotection, only augment with pitocen if category 3 tracing develops. Stop Indocin. Continue with ampicillin.  Expectant managem

## 2017-03-23 NOTE — PROGRESS NOTES
No fluid seen in the vault,noted thick mod amt yellowish discharge in the vagina,was not able to  Visualize cx,removed part of the discharge and specimen for ffn obtained and sent.

## 2017-03-23 NOTE — H&P
Jaclyn Carroll 647 Patient Status:  Observation    1980 MRN [id number]   Location [address] C-D Attending Geovanna Hendricks MD   Hosp Day # 0 PCP No primary care provider on file.      Date o Smoking status: Never Smoker     Smokeless tobacco: Not on file    Alcohol Use: Yes    Comment: social drinker. stopped when found out she was pregnant. Allergies/Medications:    Allergies:   No Known Allergies    Medications:    Prescriptions pr ALB 2.9* 02/13/2017   ALKPHO 61 02/13/2017   BILT 0.5 02/13/2017   TP 6.7 02/13/2017   AST 30 02/13/2017   ALT 29 02/13/2017         Lab Results  Component Value Date   COLORUR Yellow 03/22/2017   CLARITY Clear 03/22/2017   SPECGRAVITY 1.005 03/22/2017

## 2017-03-23 NOTE — DISCHARGE SUMMARY
[facility] - [facility]    Discharge Summary    Kenya Leos Patient Status:  Inpatient    1980 MRN [id number]   Location [address] C-D Attending Argentina Brown MD   [facility] Day # 4       Delivering OB Clinician: Page    EDC: Estim home    Plan:     Follow-up appointment in 7 days from delivery for staple removal and 6 weeks with Dr. Meeta Ko for post partum visit      3/23/2017  5:19 PM

## 2017-03-23 NOTE — OPERATIVE REPORT
[facility]OS[facility] - [facility]     Section Delivery / Operative Note    Landon Fleming Patient Status:  Inpatient    1980 MRN [id number]   Location [address] C-D Attending Paris Dobbins MD   Hosp Day # 1 PCP No primary care provid The infant was moving extremities spontaneously. Delayed cord clamping for 40 seconds. The cord was then doubly clamped and cut. A portion of the cord was retained for cord gases.   The infant was placed in the radiant warmer for the waiting neonatologist

## 2017-03-23 NOTE — PROGRESS NOTES
Dr Jaron Gunter neonatologist @ bedside poc regarding baby and the transfer care explained to mom and dad @ [phone]

## 2017-03-23 NOTE — PROGRESS NOTES
[facility]OSP - [facility]    OB/GYNE Antepartum note      Brian Oviedo Patient Status:  Inpatient    1980 MRN [id number]   Location [address] C-D Attending Eloy Negro MD   Hosp Day # 1 PCP No primary care provider on file. Cells Few /HPF   WBC Urine 7 (H) 0-5 /HPF   RBC URINE 1 0-3 /HPF   Bacteria Urine Few (A) None Seen /HPF   -FETAL FIBRONECTIN   Collection Time: 03/22/17 10:05 PM   Result Value Ref Range   Fetal Fibrinectin Positive (A) Negative   -BLOOD TYPE, ABO AND RH

## 2017-03-23 NOTE — ANESTHESIA PREPROCEDURE EVALUATION
Anesthesia PreOp Note    HPI:     Adam Talley is a 39year old female who presents for preoperative consultation requested by: Amee Merritt MD    Date of Surgery:     Procedure(s):    Indication: * No pre-op diagnosis entered *    * No Taylor DOUBLE ELECTRIC BREAST PUMP    Disp: 1 each Rfl: 0 Taking       Current Facility-Administered Medications Ordered in Epic:  oxyTOCIN (PITOCIN) 30 units/ 500 ml premix infusion         citric acid-sodium citrate (BICITRA) 334-500 MG/5ML solution of Children: N/A     Occupational History  None on file     Social History Main Topics   Smoking status: Never Smoker     Smokeless tobacco: Not on file    Alcohol Use: Yes    Comment: social drinker. stopped when found out she was pregnant.      Drug Use: Discussed With:  Patient and spouse  Blood Product Use Consented    Discussed plan with:  Surgeon  Provider Attestation (if preop done by other):  SA with Duramorph IT,possible GA        I have informed Goran Rock  of the nature of the anesthetic norris

## 2017-03-23 NOTE — PROGRESS NOTES
Dr Selvin Shelton was made aware of contx patterns by Wendy Ybarra rn.and received order for iv start and bolus.

## 2017-03-23 NOTE — PROGRESS NOTES
Bedside u/s done by dr Gilda Madrigal and confirmed vx presentation @ 0013,poc explained to pt and s o,and cancelled decision for c section.

## 2017-03-23 NOTE — PLAN OF CARE
ANTEPARTUM/LABOR and DELIVERY    • Maintain pregnancy as long as maternal and/or fetal condition is stable Completed    • Anxiety is at manageable level Completed    • Demonstrates ability to cope with hospitalization/illness Completed          ANXIETY

## 2017-03-24 LAB
BASOPHILS # BLD: 0 K/UL (ref 0–0.2)
BASOPHILS NFR BLD: 0 %
EOSINOPHIL # BLD: 0 K/UL (ref 0–0.7)
EOSINOPHIL NFR BLD: 0 %
ERYTHROCYTE [DISTWIDTH] IN BLOOD BY AUTOMATED COUNT: 14.5 % (ref 11–15)
GLUCOSE BLDC GLUCOMTR-MCNC: 119 MG/DL (ref 70–99)
HCT VFR BLD AUTO: 25.9 % (ref 35–48)
HGB BLD-MCNC: 8.6 G/DL (ref 12–16)
LYMPHOCYTES # BLD: 1.9 K/UL (ref 1–4)
LYMPHOCYTES NFR BLD: 11 %
MCH RBC QN AUTO: 29.9 PG (ref 27–32)
MCHC RBC AUTO-ENTMCNC: 33.1 G/DL (ref 32–37)
MCV RBC AUTO: 90.3 FL (ref 80–100)
MONOCYTES # BLD: 1.4 K/UL (ref 0–1)
MONOCYTES NFR BLD: 8 %
NEUTROPHILS # BLD AUTO: 14.2 K/UL (ref 1.8–7.7)
NEUTROPHILS NFR BLD: 81 %
PLATELET # BLD AUTO: 216 K/UL (ref 140–400)
PMV BLD AUTO: 9.2 FL (ref 7.4–10.3)
RBC # BLD AUTO: 2.87 M/UL (ref 3.7–5.4)
WBC # BLD AUTO: 17.6 K/UL (ref 4–11)

## 2017-03-24 RX ORDER — KETOROLAC TROMETHAMINE 30 MG/ML
30 INJECTION, SOLUTION INTRAMUSCULAR; INTRAVENOUS EVERY 6 HOURS PRN
Status: DISPENSED | OUTPATIENT
Start: 2017-03-24 | End: 2017-03-26

## 2017-03-24 RX ORDER — DEXTROSE, SODIUM CHLORIDE, SODIUM LACTATE, POTASSIUM CHLORIDE, AND CALCIUM CHLORIDE 5; .6; .31; .03; .02 G/100ML; G/100ML; G/100ML; G/100ML; G/100ML
INJECTION, SOLUTION INTRAVENOUS CONTINUOUS
Status: DISCONTINUED | OUTPATIENT
Start: 2017-03-24 | End: 2017-03-26

## 2017-03-24 NOTE — PLAN OF CARE
Problem: BREAST FEEDING  Goal: Optimize infant feeding at the breast  INTERVENTIONS:  - Initiate breast feeding within first hour after birth. - Monitor effectiveness of current breast feeding efforts.   - Assess support systems available to mother/family Discourage use of pacifier-artificial nipple  - Educate mother on feeding cues   Outcome: Progressing  Not getting any milk yet pumping. Encouraged hands-on pumping and hand expression after pumping.     Problem: POSTPARTUM  Goal: Optimize infant feeding a

## 2017-03-24 NOTE — PLAN OF CARE
Problem: BREAST FEEDING  Goal: Optimize infant feeding at the breast  INTERVENTIONS:  - Initiate breast feeding within first hour after birth. - Monitor effectiveness of current breast feeding efforts.   - Assess support systems available to mother/family techniques  - Discourage use of pacifier-artificial nipple  - Educate mother on feeding cues  Outcome: Progressing  Breast pumping initiated. No milk obtained. Reviewed what to expect regarding feeding a very  baby.

## 2017-03-24 NOTE — ANESTHESIA POST-OP FOLLOW-UP NOTE
POD 1 C section/duramorph. No headache/nausea/pruritis. No other anesthesia followup needed at this time.

## 2017-03-24 NOTE — PLAN OF CARE
BREAST FEEDING    • Optimize infant feeding at the breast Progressing          POSTPARTUM    • Long Term Goal:Experiences normal postpartum course Progressing    • Establishment of adequate milk supply with medication/procedure interruptions Progressing

## 2017-03-24 NOTE — PROGRESS NOTES
Spoke with Dr Kathleen Drummond after MD responds to electronic page. Updated on patients contraction pattern and that FFN results are still pending at this time. Orders received to start peripheral IV and LR Bolus to hydrate patient while awaiting results.  Orders give

## 2017-03-24 NOTE — ANESTHESIA POST-OP FOLLOW-UP NOTE
POD 1 s/p duramorph C section. Doing well. No headache/nausea/itching. No other anesthesia followup needed at this time.

## 2017-03-24 NOTE — PLAN OF CARE
BIRTH - VAGINAL/ SECTION    • Fetal and maternal status remain reassuring during the birth process Completed          ANXIETY    • Will report anxiety at manageable levels Progressing        BREAST FEEDING    • Optimize infant feeding at the breast

## 2017-03-24 NOTE — PROGRESS NOTES
Dr. Ran Fletcher notified of low urine output in 4 hours(60mL and concentrated) and pain 6/10. Orders to increase IVF to 150cc/hr and toradol q6 prn for pain.

## 2017-03-24 NOTE — ADDENDUM NOTE
Addendum  created 03/24/17 0904 by Kaylyn Mason MD    Modules edited: Notes Section    Notes Section:  File: [id number]

## 2017-03-24 NOTE — LACTATION NOTE
LACTATION NOTE - MOTHER           Problems identified  Problems identified: Knowledge deficit;Milk supply not WNL  Milk supply not WNL: Reduced (potential)  Problems Identified Other: 28 wk delivery, separation from baby, hx PCOS, AMA, obesity    Maternal

## 2017-03-25 NOTE — PROGRESS NOTES
[facility] HOSP - [facility]    Post  Progress Note      Linda Brady Patient Status:  Inpatient    1980 MRN [id number]   Location [facility] 3SE Attending Yuliana Feliz MD   Hosp Day # 3 PCP No primary care provider on file.

## 2017-03-25 NOTE — PROGRESS NOTES
SPOKE WITH DR HODGES REGARDING LABETOLOL ORDER. HOLD MED FOR NOW. LABETOLOL CAN BE RESTARTED TONIGHT @2100. CALL THE ON CALL OB IF BLOOD PRESSURES ARE LOW.

## 2017-03-25 NOTE — PROGRESS NOTES
Spoke with Dr Francisco Tenorio regarding placenta culture testing +1 positive haemophilus influenzae A. Pt non-symptomatic. No new orders received. Will call MD if pt becomes symptomatic. Will page elzbieta to give results.

## 2017-03-26 VITALS
SYSTOLIC BLOOD PRESSURE: 123 MMHG | OXYGEN SATURATION: 96 % | WEIGHT: 228 LBS | BODY MASS INDEX: 37 KG/M2 | TEMPERATURE: 99 F | RESPIRATION RATE: 16 BRPM | DIASTOLIC BLOOD PRESSURE: 61 MMHG | HEART RATE: 80 BPM

## 2017-03-26 RX ORDER — IBUPROFEN 600 MG/1
600 TABLET ORAL EVERY 6 HOURS
Qty: 60 TABLET | Refills: 0 | Status: SHIPPED | OUTPATIENT
Start: 2017-03-26

## 2017-03-26 RX ORDER — PSEUDOEPHEDRINE HCL 30 MG
100 TABLET ORAL 2 TIMES DAILY
Qty: 60 CAPSULE | Refills: 0 | Status: SHIPPED | OUTPATIENT
Start: 2017-03-26 | End: 2017-05-12

## 2017-03-26 RX ORDER — MELATONIN
325
Qty: 30 TABLET | Refills: 0 | Status: SHIPPED | OUTPATIENT
Start: 2017-03-26 | End: 2017-04-22

## 2017-03-26 RX ORDER — HYDROCODONE BITARTRATE AND ACETAMINOPHEN 5; 325 MG/1; MG/1
1 TABLET ORAL EVERY 6 HOURS PRN
Qty: 30 TABLET | Refills: 0 | Status: ON HOLD | OUTPATIENT
Start: 2017-03-26 | End: 2017-04-25

## 2017-03-26 NOTE — LACTATION NOTE
Mom is pumping more regularly today but getting discouraged that she is not getting any milk. Assured her that this is normal and encouraged her to keep pumping regularly. She does not have a breast pump.   Told her to call her insurance provider to let t

## 2017-03-26 NOTE — PROGRESS NOTES
[facility]    OB/Gyne Post  Section Progress Note      Brown [place] Patient Status:  Inpatient    1980 MRN [id number]   Location [facility] 3SE Attending Xavier Taylor MD   Hosp Day # 4 PCP No primary care provid

## 2017-03-27 ENCOUNTER — TELEPHONE (OUTPATIENT)
Dept: OBGYN CLINIC | Facility: CLINIC | Age: 37
End: 2017-03-27

## 2017-03-27 NOTE — TELEPHONE ENCOUNTER
Pt. States that her C/S was done on 3/23/17 per CAP. Per pt she noticed some yellow liquid on her underwear and coming out of both sides of her incision. Pt.  Offered an appt today for an incision check, but she declined due to going to visit baby at [facility]

## 2017-03-28 ENCOUNTER — NURSE ONLY (OUTPATIENT)
Dept: OBGYN CLINIC | Facility: CLINIC | Age: 37
End: 2017-03-28

## 2017-03-28 DIAGNOSIS — Z51.89 VISIT FOR WOUND CHECK: Primary | ICD-10-CM

## 2017-03-28 NOTE — PROGRESS NOTES
PT IS FIVE DAYS POST  AND IS  HERE TODAY FOR INCISION CHECK,PT IS COMPLAINING ABOUT SOME YELLOW LIQUID DISCHARGE FROM THE INCISION SINCE YESTERDAY,DR RHODES  DID EXAM  THE INCISION AND THERE IS NO CONCERN ABOUT INFECTION, PT WILL RETURN TO THE

## 2017-03-29 ENCOUNTER — OFFICE VISIT (OUTPATIENT)
Dept: OBGYN CLINIC | Facility: CLINIC | Age: 37
End: 2017-03-29

## 2017-03-29 ENCOUNTER — APPOINTMENT (OUTPATIENT)
Dept: CT IMAGING | Facility: HOSPITAL | Age: 37
End: 2017-03-29
Attending: EMERGENCY MEDICINE
Payer: COMMERCIAL

## 2017-03-29 ENCOUNTER — TELEPHONE (OUTPATIENT)
Dept: OBGYN CLINIC | Facility: CLINIC | Age: 37
End: 2017-03-29

## 2017-03-29 ENCOUNTER — HOSPITAL ENCOUNTER (EMERGENCY)
Facility: HOSPITAL | Age: 37
Discharge: STILL ADMITTED OR ADMINISTRATIVE | End: 2017-03-29
Payer: COMMERCIAL

## 2017-03-29 ENCOUNTER — HOSPITAL ENCOUNTER (EMERGENCY)
Facility: HOSPITAL | Age: 37
Discharge: HOME | End: 2017-03-29
Attending: EMERGENCY MEDICINE
Payer: COMMERCIAL

## 2017-03-29 VITALS
OXYGEN SATURATION: 97 % | RESPIRATION RATE: 16 BRPM | HEART RATE: 77 BPM | HEIGHT: 67 IN | DIASTOLIC BLOOD PRESSURE: 82 MMHG | WEIGHT: 219 LBS | SYSTOLIC BLOOD PRESSURE: 145 MMHG | TEMPERATURE: 98 F | BODY MASS INDEX: 34.37 KG/M2

## 2017-03-29 DIAGNOSIS — L03.311 ABDOMINAL WALL CELLULITIS: Primary | ICD-10-CM

## 2017-03-29 DIAGNOSIS — L03.311 CELLULITIS OF ABDOMINAL WALL: ICD-10-CM

## 2017-03-29 LAB
ANION GAP SERPL CALC-SCNC: 10 MMOL/L (ref 0–18)
BASOPHILS # BLD: 0.1 K/UL (ref 0–0.2)
BASOPHILS NFR BLD: 1 %
BUN SERPL-MCNC: 10 MG/DL (ref 8–20)
BUN/CREAT SERPL: 22.2 (ref 10–20)
CALCIUM SERPL-MCNC: 8.5 MG/DL (ref 8.5–10.5)
CHLORIDE SERPL-SCNC: 108 MMOL/L (ref 95–110)
CO2 SERPL-SCNC: 22 MMOL/L (ref 22–32)
CREAT SERPL-MCNC: 0.45 MG/DL (ref 0.5–1.5)
EOSINOPHIL # BLD: 0.3 K/UL (ref 0–0.7)
EOSINOPHIL NFR BLD: 2 %
ERYTHROCYTE [DISTWIDTH] IN BLOOD BY AUTOMATED COUNT: 14.3 % (ref 11–15)
GLUCOSE SERPL-MCNC: 80 MG/DL (ref 70–99)
HCT VFR BLD AUTO: 29.7 % (ref 35–48)
HGB BLD-MCNC: 9.7 G/DL (ref 12–16)
LYMPHOCYTES # BLD: 3 K/UL (ref 1–4)
LYMPHOCYTES NFR BLD: 23 %
MCH RBC QN AUTO: 28.8 PG (ref 27–32)
MCHC RBC AUTO-ENTMCNC: 32.7 G/DL (ref 32–37)
MCV RBC AUTO: 88 FL (ref 80–100)
MONOCYTES # BLD: 1.2 K/UL (ref 0–1)
MONOCYTES NFR BLD: 9 %
NEUTROPHILS # BLD AUTO: 8.5 K/UL (ref 1.8–7.7)
NEUTROPHILS NFR BLD: 65 %
OSMOLALITY UR CALC.SUM OF ELEC: 288 MOSM/KG (ref 275–295)
PLATELET # BLD AUTO: 341 K/UL (ref 140–400)
PMV BLD AUTO: 8.2 FL (ref 7.4–10.3)
POTASSIUM SERPL-SCNC: 3.4 MMOL/L (ref 3.3–5.1)
RBC # BLD AUTO: 3.37 M/UL (ref 3.7–5.4)
SODIUM SERPL-SCNC: 140 MMOL/L (ref 136–144)
WBC # BLD AUTO: 13 K/UL (ref 4–11)

## 2017-03-29 PROCEDURE — 74177 CT ABD & PELVIS W/CONTRAST: CPT

## 2017-03-29 PROCEDURE — 99213 OFFICE O/P EST LOW 20 MIN: CPT | Performed by: OBSTETRICS & GYNECOLOGY

## 2017-03-29 RX ORDER — CLINDAMYCIN PHOSPHATE 600 MG/50ML
600 INJECTION INTRAVENOUS EVERY 8 HOURS
Status: DISCONTINUED | OUTPATIENT
Start: 2017-03-29 | End: 2017-03-29

## 2017-03-29 RX ORDER — CLINDAMYCIN HYDROCHLORIDE 300 MG/1
300 CAPSULE ORAL 3 TIMES DAILY
Qty: 30 CAPSULE | Refills: 0 | Status: SHIPPED | OUTPATIENT
Start: 2017-03-29 | End: 2017-04-08

## 2017-03-29 NOTE — ED PROVIDER NOTES
Patient Seen in: [facility] Emergency Department    History   Patient presents with:  Derm Problem    Stated Complaint: sent from he for hematoma on c section incision     HPI    40 yo F with HTN, gestational DM and currently POD 6 from LTCS prese mouth 2 (two) times daily.        Family History   Problem Relation Age of Onset   • Hypertension Father    • Diabetes Father      pre-diabetic    • Cancer Father 50     prostate cancer and colorectal cancer, currently in remission   • Hypertension Mother Alert.   Skin: Skin is warm. Psychiatric: Cooperative. Nursing note and vitals reviewed.         ED Course     Labs Reviewed   BASIC METABOLIC PANEL (8) - Abnormal; Notable for the following:     Creatinine 0.45 (*)     BUN/CREA Ratio 22.2 (*)     All ot small to accurately characterize but are most likely cysts. GI/MESENTERY: No obstruction. Limited by lack of oral contrast. There are a few colonic diverticula. Normal-caliber appendix present in the right lower quadrant. VASCULAR: Unremarkable.   LYMPH NOD DIFFERENTIAL DIAGNOSIS: After history and physical exam differential diagnosis was considered for cellulitis, seroma, abscess.     Pulse ox: 98%:Normal on RA, as interpreted by myself    Evaluation for lower abdominal discomfort with cellulitic change n

## 2017-03-29 NOTE — TELEPHONE ENCOUNTER
PER PT STATE SHE WAS SEEN YESTERDAY FOR A WOUND CHECK / PT STATE SHE HAS ORANGE DISCHARGE WITH BURNING / PAIN / PLS ADV

## 2017-03-29 NOTE — PROGRESS NOTES
Patient called today as she was leaking more fluid from her incision site. I was asked to see the patient by the MA as there was yellow fluid draining from the incision. Pt denies fevers or chills. Denies pus coming from the  Incision site.  She states

## 2017-03-29 NOTE — TELEPHONE ENCOUNTER
Pt states her C/S wound is burning and seems to have some orange discharge. Pt was seen yesterday by [facility] and no infection noted. Offered pt an appt d/t high risk for infection given pt's weight. Pt accepted 2 pm appt today and has no further questions.

## 2017-03-29 NOTE — PAYOR COMM NOTE
Expand All Collapse All      [facility] - [facility]    Discharge Summary  [address] [facility] Patient Status:  Inpatient    [facility] 6/9/1980  MRN  [id number]    [facility] 3E C-D  Attending  MD Isamar Constantino sign of DVT    Discharged Condition: stable    Disposition: home      Plan:      Follow-up appointment in 7 days from delivery for staple removal and 6 weeks with Dr. Christian Feldman for post partum visit

## 2017-03-29 NOTE — ED INITIAL ASSESSMENT (HPI)
Patient sent from pcp for evaluation of hematoma in c section incision, 3/23/17 c section occurred, redness noted around incision, staples removed by pcp towards middle of incision, serosanguinous drainage on dressing

## 2017-03-30 NOTE — H&P
Jaclyn Blackwello 647 Patient Status:  Emergency    1980 MRN [id number]   Location [address] Attending Randall Zayas MD   Hosp Day # 0 PCP Farooq Whitten MD     Date of Admiss at outside clinic      Obstetric History:   OB History    Para Term  AB SAB TAB Ectopic Multiple Living   1 1  1     0 1      # Outcome Date GA Lbr Hugo/2nd Weight Sex Delivery Anes PTL Lv   1  17 28w3d  3 lb 5.4 oz (1.515 kg) F C Alert and Oriented  Abdomen: Soft, non tender, no rebound, or guarding  Inc: midline open approx 3-4cm with minimal serosanguinous non purulent drainage noted.   The incision has induration in the lateral portion with superficial erythema circumferentially 3. 70-5.40 M/UL   HGB 9.7 (L) 12.0-16.0 g/dL   HCT 29.7 (L) 35.0-48.0 %   MCV 88.0 80.0-100.0 fL   MCH 28.8 27.0-32.0 pg   MCHC 32.7 32.0-37.0 g/dl   RDW 14.3 11.0-15.0 %    140-400 K/UL   MPV 8.2 7.4-10.3 fL   Neutrophil % 65 %   Lymphocyte % 23 %

## 2017-03-30 NOTE — LACTATION NOTE
Patient seen in ED. Breast pump set up with instructions for use.  Patient reports CT scan with contrast. Advised to save breast milk pumped and inform infant's pediatrician about contrast. Infant is premature in NICU at [facility].

## 2017-03-31 ENCOUNTER — NURSE ONLY (OUTPATIENT)
Dept: OBGYN CLINIC | Facility: CLINIC | Age: 37
End: 2017-03-31

## 2017-03-31 VITALS — TEMPERATURE: 98 F | HEART RATE: 93 BPM | SYSTOLIC BLOOD PRESSURE: 140 MMHG | DIASTOLIC BLOOD PRESSURE: 96 MMHG

## 2017-03-31 DIAGNOSIS — Z51.89 VISIT FOR WOUND CHECK: Primary | ICD-10-CM

## 2017-03-31 RX ORDER — FERROUS SULFATE 325(65) MG
TABLET ORAL
COMMUNITY
Start: 2017-03-26 | End: 2017-03-31

## 2017-03-31 NOTE — PROGRESS NOTES
Patient in today for Postpartum incision check. Patient Delivered 3-23-17 with CAP. Patient returning for F/U after ER visit. Patient continues to have small amounts of drainage for midpoint of incision line.  Incision seems closed and remaining staples int

## 2017-04-06 ENCOUNTER — NURSE ONLY (OUTPATIENT)
Dept: OBGYN CLINIC | Facility: CLINIC | Age: 37
End: 2017-04-06

## 2017-04-06 VITALS — DIASTOLIC BLOOD PRESSURE: 86 MMHG | TEMPERATURE: 98 F | SYSTOLIC BLOOD PRESSURE: 136 MMHG | HEART RATE: 76 BPM

## 2017-04-06 DIAGNOSIS — Z51.89 VISIT FOR WOUND CHECK: Primary | ICD-10-CM

## 2017-04-06 NOTE — PROGRESS NOTES
Pt seen for wound check by PCT today. Called into the room to look at incision. No signs of infection and draining clear fluid. Small area about 1.5cm of skin open and draining. Area probed and no increase in fluid leakage and no pus noted.  Area looks gonzalez

## 2017-04-11 ENCOUNTER — TELEPHONE (OUTPATIENT)
Dept: OBGYN CLINIC | Facility: CLINIC | Age: 37
End: 2017-04-11

## 2017-04-11 ENCOUNTER — NURSE ONLY (OUTPATIENT)
Dept: OBGYN CLINIC | Facility: CLINIC | Age: 37
End: 2017-04-11

## 2017-04-11 VITALS — DIASTOLIC BLOOD PRESSURE: 84 MMHG | HEART RATE: 73 BPM | SYSTOLIC BLOOD PRESSURE: 137 MMHG

## 2017-04-11 DIAGNOSIS — Z51.89 VISIT FOR WOUND CHECK: Primary | ICD-10-CM

## 2017-04-11 PROCEDURE — 99213 OFFICE O/P EST LOW 20 MIN: CPT | Performed by: OBSTETRICS & GYNECOLOGY

## 2017-04-11 NOTE — PROGRESS NOTES
SPOKE [facility]. SHE BROUGHT UP ROPE SILVERLON, ABD PADS AND TAPE.   THEY WILL SEE THE PT FRI AT 7:15AM.  THEY WILL ASSESS THE WOUND AT THAT TIME AND LET US KNOW IF SHE NEEDS ANYTHING DIFFERENT OTHERWISE WILL BE INSTRUCTED ON USE AND KAMILA

## 2017-04-12 NOTE — PROGRESS NOTES
Patient presents for wound check. She states she has been doing well and there is less drainage from the incision site. Denies fevers or chills. She denies foul odor or pus.  Patient states she was unable to pack the incision herself and her boyfriend could

## 2017-04-13 ENCOUNTER — TELEPHONE (OUTPATIENT)
Dept: OBGYN CLINIC | Facility: CLINIC | Age: 37
End: 2017-04-13

## 2017-04-13 NOTE — TELEPHONE ENCOUNTER
Pt stated she was in the office on 4/11 for an incision check and stated there is no antibiotic at her pharmacy.  Per [facility] notes from visit on 4/11, \"Will not start Abx at this time since no signs of infection and Silverlon will likely help healing process

## 2017-04-14 ENCOUNTER — OFFICE VISIT (OUTPATIENT)
Dept: WOUND CARE | Facility: HOSPITAL | Age: 37
End: 2017-04-14
Attending: NURSE PRACTITIONER
Payer: COMMERCIAL

## 2017-04-14 PROCEDURE — 97162 PT EVAL MOD COMPLEX 30 MIN: CPT

## 2017-04-14 PROCEDURE — 97605 NEG PRS WND THER DME<=50SQCM: CPT

## 2017-04-14 PROCEDURE — 97607 NEG PRS WND THR NDME<=50SQCM: CPT

## 2017-04-14 NOTE — PROGRESS NOTES
Subjective    Chief Complaint  This information was obtained from the patient  The patient is new to the [address] [facility] here for an initial visit for the evaluation and management of non-healing wound(s). C Section area is not healed.     Allergies  no known a of:  Anemia  Anxiety  Asthma  Hypertension  polycystic ovarian syndrom  Infertility  Obesity  varicella    Surgical History  This information was obtained from the patient  Patient has a surgical history of:  Cholecystectomy      Additional Inform skin moisture is normal. The periwound skin color is normal. The temperature of the periwound skin is WNL. Periwound skin does not exhibit signs or symptoms of infection. Assessment  Pt evaluated for a  dehiscence.   Pt has significant depth Periwound  Dressing secured with drape / transparent film.   Negative pressure setting: using 125 mmHg (1)  NPWT pressure type: using Continuous (1)  Patient tolerated treatment well without abnormal reactions  Time spent (units are in minutes) using Time (

## 2017-04-18 ENCOUNTER — OFFICE VISIT (OUTPATIENT)
Dept: WOUND CARE | Facility: HOSPITAL | Age: 37
End: 2017-04-18
Attending: NURSE PRACTITIONER
Payer: COMMERCIAL

## 2017-04-18 ENCOUNTER — TELEPHONE (OUTPATIENT)
Dept: OBGYN CLINIC | Facility: CLINIC | Age: 37
End: 2017-04-18

## 2017-04-18 DIAGNOSIS — T81.89XA NON-HEALING SURGICAL WOUND: Primary | ICD-10-CM

## 2017-04-18 PROCEDURE — 87147 CULTURE TYPE IMMUNOLOGIC: CPT

## 2017-04-18 PROCEDURE — 97607 NEG PRS WND THR NDME<=50SQCM: CPT

## 2017-04-18 PROCEDURE — 87205 SMEAR GRAM STAIN: CPT

## 2017-04-18 PROCEDURE — 87070 CULTURE OTHR SPECIMN AEROBIC: CPT

## 2017-04-18 NOTE — PROGRESS NOTES
Subjective    Chief Complaint  This information was obtained from the patient  The patient was seen today for follow up and management of difficult to heal wound(s). patient woke up yesterday am with a odor coming from her wound.     Allergies  no known all Active Problems    ICD-10  (Encounter Diagnosis) O90.2 - Hematoma of obstetric wound  (Encounter Diagnosis) O90.0 - Disruption of  delivery wound    Diagnoses    ICD-10  O90.2: Hematoma of obstetric wound  O90.0: Disruption of  delivery w

## 2017-04-21 ENCOUNTER — TELEPHONE (OUTPATIENT)
Dept: OBGYN CLINIC | Facility: CLINIC | Age: 37
End: 2017-04-21

## 2017-04-21 ENCOUNTER — OFFICE VISIT (OUTPATIENT)
Dept: WOUND CARE | Facility: HOSPITAL | Age: 37
End: 2017-04-21
Attending: NURSE PRACTITIONER
Payer: COMMERCIAL

## 2017-04-21 PROCEDURE — 97607 NEG PRS WND THR NDME<=50SQCM: CPT

## 2017-04-21 NOTE — TELEPHONE ENCOUNTER
OB GYN SURGICAL SCHEDULING    Diagnosis: Wound Hematoma     Operative Procedure: Wound exploration and application of wound vac     Side: generalized    Date: Monday or Tuesday 4/24 and 4/25     Admission:  Day Surgery    Anesthesia:  choice     Bowel Prep

## 2017-04-21 NOTE — TELEPHONE ENCOUNTER
Called patient to review the plan of care with her. Reviewed that recommendations from the wound clinic were to open the wound and wash it out. Reviewed that different wound vac will be placed at this time. Pt to have procedure by [address] on Tuesday.  Reviewed

## 2017-04-21 NOTE — TELEPHONE ENCOUNTER
Patient is scheduled 4/25/17 at 8:30am [facility]/wound care rn. Patient's Pat orders routed. Patient routed instructions via [employer]. Patient informed.    Received forms from wound care clinic and routed it to [phone] as instructed and confirmation was rec

## 2017-04-21 NOTE — PROGRESS NOTES
Subjective    Chief Complaint  This information was obtained from the patient  The patient was seen today for follow up and management of difficult to heal wound(s).  patient states the rc alarm went off this am.    Allergies  no known allergies    HPI  T Wound #1 Abdomen C Section is an acute Full Thickness Surgical Wound and has received a status of Not Healed. Subsequent wound encounter measurements are 1.5cm length x 0.2cm width x 5.5cm depth, with an area of 0.3 sq cm and a volume of 1.65 cubic cm.  A s Applied Primary Wound Dressing. using Silverlon (1)  Negative pressure wound therapy  Negative pressure system used: using Other - see notes (1)  Shaved Periwound  Dressing secured with drape / transparent film.   Negative pressure setting: using 125 mmHg (

## 2017-04-22 RX ORDER — FERROUS SULFATE 325(65) MG
TABLET ORAL
Qty: 30 TABLET | Refills: 0 | Status: SHIPPED | OUTPATIENT
Start: 2017-04-22 | End: 2017-05-24

## 2017-04-24 ENCOUNTER — TELEPHONE (OUTPATIENT)
Dept: OBGYN CLINIC | Facility: CLINIC | Age: 37
End: 2017-04-24

## 2017-04-24 ENCOUNTER — OFFICE VISIT (OUTPATIENT)
Dept: WOUND CARE | Facility: HOSPITAL | Age: 37
End: 2017-04-24
Attending: NURSE PRACTITIONER
Payer: COMMERCIAL

## 2017-04-24 PROCEDURE — G0463 HOSPITAL OUTPT CLINIC VISIT: HCPCS

## 2017-04-24 PROCEDURE — 99212 OFFICE O/P EST SF 10 MIN: CPT

## 2017-04-24 NOTE — TELEPHONE ENCOUNTER
Pt stts Dr Bertha Francis called her - she has a procedure for tm for a wound , and would like to spk w someone in [facility] to this. Her ph # [phone]. Please advise.

## 2017-04-24 NOTE — TELEPHONE ENCOUNTER
Message to [facility] [employer]  to please call pt. Per [facility], RN is to page him when pt calls back. [address] [employer]  paged and informed pt is calling back.

## 2017-04-24 NOTE — PROGRESS NOTES
Subjective    Chief Complaint  This information was obtained from the patient  The patient was seen today for follow up and management of difficult to heal wound(s).    4/24/17: Patient states the rc alarm went off this am.    Allergies  no known allergie tunneling/undermining.     Active Problems    ICD-10  (Encounter Diagnosis) O90.2 - Hematoma of obstetric wound  (Encounter Diagnosis) O90.0 - Disruption of  delivery wound    Diagnoses    ICD-10  O90.2: Hematoma of obstetric wound  O90.0: Disruptio

## 2017-04-25 ENCOUNTER — HOSPITAL ENCOUNTER (OUTPATIENT)
Facility: HOSPITAL | Age: 37
Setting detail: HOSPITAL OUTPATIENT SURGERY
Discharge: HOME | End: 2017-04-25
Attending: OBSTETRICS & GYNECOLOGY | Admitting: OBSTETRICS & GYNECOLOGY
Payer: COMMERCIAL

## 2017-04-25 ENCOUNTER — ANESTHESIA (OUTPATIENT)
Dept: SURGERY | Facility: HOSPITAL | Age: 37
End: 2017-04-25
Payer: COMMERCIAL

## 2017-04-25 ENCOUNTER — SURGERY (OUTPATIENT)
Age: 37
End: 2017-04-25

## 2017-04-25 ENCOUNTER — ANESTHESIA EVENT (OUTPATIENT)
Dept: SURGERY | Facility: HOSPITAL | Age: 37
End: 2017-04-25
Payer: COMMERCIAL

## 2017-04-25 VITALS
RESPIRATION RATE: 16 BRPM | OXYGEN SATURATION: 97 % | HEIGHT: 66 IN | SYSTOLIC BLOOD PRESSURE: 117 MMHG | BODY MASS INDEX: 33.59 KG/M2 | HEART RATE: 70 BPM | DIASTOLIC BLOOD PRESSURE: 58 MMHG | TEMPERATURE: 98 F | WEIGHT: 209 LBS

## 2017-04-25 DIAGNOSIS — T81.89XA DELAYED SURGICAL WOUND HEALING, INITIAL ENCOUNTER: Primary | ICD-10-CM

## 2017-04-25 PROCEDURE — 0J980ZZ DRAINAGE OF ABDOMEN SUBCUTANEOUS TISSUE AND FASCIA, OPEN APPROACH: ICD-10-PCS | Performed by: OBSTETRICS & GYNECOLOGY

## 2017-04-25 PROCEDURE — 49002 REOPENING OF ABDOMEN: CPT | Performed by: OBSTETRICS & GYNECOLOGY

## 2017-04-25 RX ORDER — MORPHINE SULFATE 2 MG/ML
2 INJECTION, SOLUTION INTRAMUSCULAR; INTRAVENOUS EVERY 10 MIN PRN
Status: DISCONTINUED | OUTPATIENT
Start: 2017-04-25 | End: 2017-04-25

## 2017-04-25 RX ORDER — HYDROMORPHONE HYDROCHLORIDE 1 MG/ML
0.4 INJECTION, SOLUTION INTRAMUSCULAR; INTRAVENOUS; SUBCUTANEOUS EVERY 5 MIN PRN
Status: DISCONTINUED | OUTPATIENT
Start: 2017-04-25 | End: 2017-04-25

## 2017-04-25 RX ORDER — METOCLOPRAMIDE 10 MG/1
10 TABLET ORAL ONCE
Status: COMPLETED | OUTPATIENT
Start: 2017-04-25 | End: 2017-04-25

## 2017-04-25 RX ORDER — SODIUM CHLORIDE, SODIUM LACTATE, POTASSIUM CHLORIDE, CALCIUM CHLORIDE 600; 310; 30; 20 MG/100ML; MG/100ML; MG/100ML; MG/100ML
INJECTION, SOLUTION INTRAVENOUS CONTINUOUS
Status: DISCONTINUED | OUTPATIENT
Start: 2017-04-25 | End: 2017-04-25

## 2017-04-25 RX ORDER — HYDROCODONE BITARTRATE AND ACETAMINOPHEN 5; 325 MG/1; MG/1
1 TABLET ORAL EVERY 6 HOURS PRN
Qty: 15 TABLET | Refills: 0 | Status: SHIPPED | OUTPATIENT
Start: 2017-04-25 | End: 2017-05-12

## 2017-04-25 RX ORDER — MORPHINE SULFATE 10 MG/ML
6 INJECTION, SOLUTION INTRAMUSCULAR; INTRAVENOUS EVERY 10 MIN PRN
Status: DISCONTINUED | OUTPATIENT
Start: 2017-04-25 | End: 2017-04-25

## 2017-04-25 RX ORDER — HYDROCODONE BITARTRATE AND ACETAMINOPHEN 5; 325 MG/1; MG/1
1 TABLET ORAL EVERY 4 HOURS PRN
Status: DISCONTINUED | OUTPATIENT
Start: 2017-04-25 | End: 2017-04-25

## 2017-04-25 RX ORDER — MIDAZOLAM HYDROCHLORIDE 1 MG/ML
INJECTION INTRAMUSCULAR; INTRAVENOUS AS NEEDED
Status: DISCONTINUED | OUTPATIENT
Start: 2017-04-25 | End: 2017-04-25 | Stop reason: SURG

## 2017-04-25 RX ORDER — LIDOCAINE HYDROCHLORIDE 10 MG/ML
INJECTION, SOLUTION EPIDURAL; INFILTRATION; INTRACAUDAL; PERINEURAL AS NEEDED
Status: DISCONTINUED | OUTPATIENT
Start: 2017-04-25 | End: 2017-04-25 | Stop reason: SURG

## 2017-04-25 RX ORDER — HYDROCODONE BITARTRATE AND ACETAMINOPHEN 5; 325 MG/1; MG/1
1 TABLET ORAL AS NEEDED
Status: DISCONTINUED | OUTPATIENT
Start: 2017-04-25 | End: 2017-04-25

## 2017-04-25 RX ORDER — MORPHINE SULFATE 4 MG/ML
4 INJECTION, SOLUTION INTRAMUSCULAR; INTRAVENOUS EVERY 10 MIN PRN
Status: DISCONTINUED | OUTPATIENT
Start: 2017-04-25 | End: 2017-04-25

## 2017-04-25 RX ORDER — NALOXONE HYDROCHLORIDE 0.4 MG/ML
80 INJECTION, SOLUTION INTRAMUSCULAR; INTRAVENOUS; SUBCUTANEOUS AS NEEDED
Status: DISCONTINUED | OUTPATIENT
Start: 2017-04-25 | End: 2017-04-25

## 2017-04-25 RX ORDER — SODIUM CHLORIDE 0.9 % (FLUSH) 0.9 %
10 SYRINGE (ML) INJECTION AS NEEDED
Status: DISCONTINUED | OUTPATIENT
Start: 2017-04-25 | End: 2017-04-25

## 2017-04-25 RX ORDER — ONDANSETRON 2 MG/ML
4 INJECTION INTRAMUSCULAR; INTRAVENOUS EVERY 8 HOURS PRN
Status: DISCONTINUED | OUTPATIENT
Start: 2017-04-25 | End: 2017-04-25

## 2017-04-25 RX ORDER — HYDROCODONE BITARTRATE AND ACETAMINOPHEN 5; 325 MG/1; MG/1
2 TABLET ORAL EVERY 4 HOURS PRN
Status: DISCONTINUED | OUTPATIENT
Start: 2017-04-25 | End: 2017-04-25

## 2017-04-25 RX ORDER — HYDROCODONE BITARTRATE AND ACETAMINOPHEN 5; 325 MG/1; MG/1
2 TABLET ORAL AS NEEDED
Status: DISCONTINUED | OUTPATIENT
Start: 2017-04-25 | End: 2017-04-25

## 2017-04-25 RX ORDER — ACETAMINOPHEN 325 MG/1
650 TABLET ORAL EVERY 4 HOURS PRN
Status: DISCONTINUED | OUTPATIENT
Start: 2017-04-25 | End: 2017-04-25

## 2017-04-25 RX ORDER — ACETAMINOPHEN 325 MG/1
650 TABLET ORAL ONCE
Status: COMPLETED | OUTPATIENT
Start: 2017-04-25 | End: 2017-04-25

## 2017-04-25 RX ORDER — HYDROMORPHONE HYDROCHLORIDE 1 MG/ML
0.6 INJECTION, SOLUTION INTRAMUSCULAR; INTRAVENOUS; SUBCUTANEOUS EVERY 5 MIN PRN
Status: DISCONTINUED | OUTPATIENT
Start: 2017-04-25 | End: 2017-04-25

## 2017-04-25 RX ORDER — HYDROMORPHONE HYDROCHLORIDE 1 MG/ML
0.2 INJECTION, SOLUTION INTRAMUSCULAR; INTRAVENOUS; SUBCUTANEOUS EVERY 5 MIN PRN
Status: DISCONTINUED | OUTPATIENT
Start: 2017-04-25 | End: 2017-04-25

## 2017-04-25 RX ORDER — ONDANSETRON 2 MG/ML
4 INJECTION INTRAMUSCULAR; INTRAVENOUS ONCE AS NEEDED
Status: DISCONTINUED | OUTPATIENT
Start: 2017-04-25 | End: 2017-04-25

## 2017-04-25 RX ORDER — FAMOTIDINE 20 MG/1
20 TABLET ORAL ONCE
Status: COMPLETED | OUTPATIENT
Start: 2017-04-25 | End: 2017-04-25

## 2017-04-25 RX ORDER — ONDANSETRON HYDROCHLORIDE 8 MG/1
4 TABLET, FILM COATED ORAL EVERY 8 HOURS PRN
Status: DISCONTINUED | OUTPATIENT
Start: 2017-04-25 | End: 2017-04-25

## 2017-04-25 RX ADMIN — SODIUM CHLORIDE, SODIUM LACTATE, POTASSIUM CHLORIDE, CALCIUM CHLORIDE: 600; 310; 30; 20 INJECTION, SOLUTION INTRAVENOUS at 08:58:00

## 2017-04-25 RX ADMIN — SODIUM CHLORIDE, SODIUM LACTATE, POTASSIUM CHLORIDE, CALCIUM CHLORIDE: 600; 310; 30; 20 INJECTION, SOLUTION INTRAVENOUS at 09:35:00

## 2017-04-25 RX ADMIN — LIDOCAINE HYDROCHLORIDE 200 MG: 10 INJECTION, SOLUTION EPIDURAL; INFILTRATION; INTRACAUDAL; PERINEURAL at 08:59:00

## 2017-04-25 RX ADMIN — MIDAZOLAM HYDROCHLORIDE 2 MG: 1 INJECTION INTRAMUSCULAR; INTRAVENOUS at 08:58:00

## 2017-04-25 NOTE — TELEPHONE ENCOUNTER
Received phone call from wound care regarding wound vac. As of phone call at 420pm, wound vac had not been cleared by [facility]. Pt scheduled for wound exploration, irrigation, and debridement with wound vac placement by wound care nurses.   Discu

## 2017-04-25 NOTE — OPERATIVE REPORT
Operative Report    preop dx: Irregular Wound healing with persistent subcutaneous wound separation and drainage  Post dx: same as above  Procedure: Wound exploration, wound culture, irrigation, debridement, and application of Silverlon Rope  Date of proce The wound was then covered with 4x4s, ABD, and paper tape. The patient tolerated the procedure well. She was transferred to the recovery room in stable condition.       She was given instructions for wound care post procedure by the wound care nurse, Taj Urban

## 2017-04-25 NOTE — ANESTHESIA POSTPROCEDURE EVALUATION
Patient: Melissa Maguire    Procedure Summary     Date Anesthesia Start Anesthesia Stop Room / Location    04/25/17 0858 [id number] [facility] OR 03 / [facility] OR       Procedure Diagnosis Surgeon Responsible Provider    I AND D ABDOMEN (N/A Abdomen) (incomplete

## 2017-04-25 NOTE — H&P
GYN H&P  Date:  2017, 8:19 AM    Subjective:   39year old  s/p primary LTCS @ 28wks for NRFS on 3/23/17, who subsequently developed c/s incision wound infection in the subcutaneous tissue.   Patient grew out H flu in the wound, and has undergon affecting pregnancy, antepartum         Date Noted: 2017      ASCUS Pap and HPV negative          Date Noted: 2017        Assessment/Plan:  39year old , s/p primary LTCS on 3/23/17 with subsequent wound infection  --per recs of wound ca

## 2017-04-25 NOTE — WOUND PROGRESS NOTE
WOUND CARE NOTE      PLAN   Recommendations:  Glucose control to help promote wound healing   Home vac paperwork completed by Outpatient wound clinic, faxed into [facility], still pending approval. MD and patient are aware    Wound(s)  Location: Lower abdomen 02/13/2017   ALT 29 02/13/2017

## 2017-04-25 NOTE — ANESTHESIA PREPROCEDURE EVALUATION
Anesthesia PreOp Note    HPI:     Jena Fuller is a 39year old female who presents for preoperative consultation requested by: Ravinder Kitchen DO    Date of Surgery: 4/25/2017    Procedure(s):   I AND D ABDOMEN  Indication: infection of wound hemat every 6 (six) hours. Disp: 60 tablet Rfl: 0 4/15/2017   docusate sodium 100 MG Oral Cap Take 100 mg by mouth 2 (two) times daily. Disp: 60 capsule Rfl: 0 Past Week at Unknown time   Cetirizine HCl (ZYRTEC ALLERGY) 10 MG Oral Cap Take 10 mg by mouth daily. status: Never Smoker     Smokeless tobacco: Never Used    Alcohol Use: Yes  0.0 oz/week    0 Standard drinks or equivalent per week         Comment: social drinker. stopped when found out she was pregnant.      Drug Use: No    Sexual Activity: Not Currently the nature of the anesthetic plan, benefits, risks, major complications, and any alternative forms of anesthetic management. All of the patient's questions were answered to the best of my ability. The patient desires the anesthetic management as planned.

## 2017-04-27 ENCOUNTER — OFFICE VISIT (OUTPATIENT)
Dept: WOUND CARE | Facility: HOSPITAL | Age: 37
End: 2017-04-27
Attending: NURSE PRACTITIONER
Payer: COMMERCIAL

## 2017-04-27 PROCEDURE — G0463 HOSPITAL OUTPT CLINIC VISIT: HCPCS

## 2017-04-27 PROCEDURE — 99212 OFFICE O/P EST SF 10 MIN: CPT

## 2017-04-27 NOTE — PROGRESS NOTES
Subjective    Chief Complaint  This information was obtained from the patient  The patient was seen today for follow up and management of difficult to heal wound(s).   4/27/17: Pt states she has had a very small amount of pain    Allergies  no known allerg Wound #1 Abdomen C Section is an acute Full Thickness Surgical Wound and has received a status of Not Healed. Subsequent wound encounter measurements are 3.8cm length x 0.5cm width x 5cm depth, with an area of 1.9 sq cm and a volume of 9.5 cubic cm.  Tunnel Applied Primary Wound Dressing. using Silverlon (1)  Applied Secondary Wound Dressing. using ABD (2), Gauze (sterile) 4x4 (2)  Dressing secured with non-allergenic tape/stockinet/wrap. using Medipore (1)  Notes  Wound flushed with saline.  Wound packed with

## 2017-05-01 ENCOUNTER — OFFICE VISIT (OUTPATIENT)
Dept: WOUND CARE | Facility: HOSPITAL | Age: 37
End: 2017-05-01
Attending: NURSE PRACTITIONER
Payer: COMMERCIAL

## 2017-05-01 PROCEDURE — 97605 NEG PRS WND THER DME<=50SQCM: CPT

## 2017-05-01 NOTE — PROGRESS NOTES
Subjective    Chief Complaint  This information was obtained from the patient  The patient was seen today for follow up and management of difficult to heal wound(s).   4/27/17: Pt states she has had a very small amount of pain. 5/1/17 patient complaint a it Wound #1 Abdomen C Section is an acute Full Thickness Surgical Wound and has received a status of Not Healed. Subsequent wound encounter measurements are 3.8cm length x 0.5cm width x 4.8cm depth, with an area of 1.9 sq cm and a volume of 9.12 cubic cm.  Dennis Cantor

## 2017-05-02 ENCOUNTER — APPOINTMENT (OUTPATIENT)
Dept: WOUND CARE | Facility: HOSPITAL | Age: 37
End: 2017-05-02
Payer: COMMERCIAL

## 2017-05-03 ENCOUNTER — OFFICE VISIT (OUTPATIENT)
Dept: WOUND CARE | Facility: HOSPITAL | Age: 37
End: 2017-05-03
Attending: NURSE PRACTITIONER
Payer: COMMERCIAL

## 2017-05-03 ENCOUNTER — TELEPHONE (OUTPATIENT)
Dept: WOUND CARE | Facility: HOSPITAL | Age: 37
End: 2017-05-03

## 2017-05-03 PROCEDURE — 97605 NEG PRS WND THER DME<=50SQCM: CPT

## 2017-05-03 NOTE — PROGRESS NOTES
Subjective    Chief Complaint  This information was obtained from the patient  The patient was seen today for follow up and management of difficult to heal wound(s).   4/27/17: Pt states she has had a very small amount of pain. 5/1/17 patient complaint a it wound  (Encounter Diagnosis) O90.0 - Disruption of  delivery wound    Diagnoses    ICD-10  O90.2: Hematoma of obstetric wound  O90.0: Disruption of  delivery wound        Plan  Continue with [employer] wound vac. Will measure next visit.     Treat

## 2017-05-03 NOTE — TELEPHONE ENCOUNTER
Jennifer Mcleod @ [phone] on 5/3/17 @ 8:43am Per Alannah oRdriguez at [facility] ([place]), no prior auth required as [facility] is [facility].

## 2017-05-04 ENCOUNTER — APPOINTMENT (OUTPATIENT)
Dept: WOUND CARE | Facility: HOSPITAL | Age: 37
End: 2017-05-04
Payer: COMMERCIAL

## 2017-05-05 ENCOUNTER — TELEPHONE (OUTPATIENT)
Dept: OBGYN CLINIC | Facility: CLINIC | Age: 37
End: 2017-05-05

## 2017-05-05 ENCOUNTER — OFFICE VISIT (OUTPATIENT)
Dept: WOUND CARE | Facility: HOSPITAL | Age: 37
End: 2017-05-05
Attending: NURSE PRACTITIONER
Payer: COMMERCIAL

## 2017-05-05 DIAGNOSIS — T81.89XD NON-HEALING SURGICAL WOUND, SUBSEQUENT ENCOUNTER: ICD-10-CM

## 2017-05-05 PROCEDURE — 97605 NEG PRS WND THER DME<=50SQCM: CPT

## 2017-05-05 NOTE — PROGRESS NOTES
Subjective    Chief Complaint  This information was obtained from the patient  The patient was seen today for follow up and management of difficult to heal wound(s). 5/5/17: No new complaints.     Allergies  no known allergies    HPI  This information was  delivery wound    Diagnoses    ICD-10  O90.2: Hematoma of obstetric wound  O90.0: Disruption of  delivery wound        Plan    Cont 3x/week per POC.             Entered By: Libertad Chatman on 2017 10:58:22 AM   Signature(s): Date(s):

## 2017-05-05 NOTE — TELEPHONE ENCOUNTER
Please assist pt with scheduling 6 week PP visit appt with MD that delivered pt. OK to use RN approval slot if needed.  Thanks

## 2017-05-05 NOTE — TELEPHONE ENCOUNTER
Pt delivered on 03/23/2017 needs a post op appt for the upcoming week on 05/08/2017 can a take a 10 mts slot ?  Double book or  A rn ?

## 2017-05-08 ENCOUNTER — OFFICE VISIT (OUTPATIENT)
Dept: WOUND CARE | Facility: HOSPITAL | Age: 37
End: 2017-05-08
Attending: NURSE PRACTITIONER
Payer: COMMERCIAL

## 2017-05-08 DIAGNOSIS — T81.89XD NON-HEALING SURGICAL WOUND, SUBSEQUENT ENCOUNTER: ICD-10-CM

## 2017-05-08 PROCEDURE — 97605 NEG PRS WND THER DME<=50SQCM: CPT

## 2017-05-08 NOTE — PROGRESS NOTES
Subjective    Chief Complaint  This information was obtained from the patient  The patient was seen today for follow up and management of difficult to heal wound(s). 5/8/17: No new complaints.     Allergies  no known allergies    HPI  This information was Wound #1 Abdomen C Section is an acute Full Thickness Surgical Wound and has received a status of Not Healed. Subsequent wound encounter measurements are 2cm length x 0.3cm width x 4cm depth, with an area of 0.6 sq cm and a volume of 2.4 cubic cm.  There is Drain tubing attached: using TRAC pad (1)  Negative pressure setting: using 125 mmHg (1)  NPWT pressure type: using Continuous (1)  Patient tolerated treatment well without abnormal reactions  Time spent (units are in minutes) using Time (30)

## 2017-05-10 ENCOUNTER — OFFICE VISIT (OUTPATIENT)
Dept: WOUND CARE | Facility: HOSPITAL | Age: 37
End: 2017-05-10
Attending: NURSE PRACTITIONER
Payer: COMMERCIAL

## 2017-05-10 DIAGNOSIS — T81.89XD NON-HEALING SURGICAL WOUND, SUBSEQUENT ENCOUNTER: ICD-10-CM

## 2017-05-10 PROCEDURE — 97605 NEG PRS WND THER DME<=50SQCM: CPT

## 2017-05-10 NOTE — PROGRESS NOTES
Subjective    Chief Complaint  This information was obtained from the patient  The patient was seen today for follow up and management of difficult to heal wound(s). 5/10/17: No new complaints.     Allergies  no known allergies    HPI  This information wa O90.0 - Disruption of  delivery wound    Diagnoses    ICD-10  O90.2: Hematoma of obstetric wound  O90.0: Disruption of  delivery wound        Plan  Cont 3x/week while on negative pressure.             Entered By: Airam Edward on 05/10/2017

## 2017-05-11 ENCOUNTER — APPOINTMENT (OUTPATIENT)
Dept: WOUND CARE | Facility: HOSPITAL | Age: 37
End: 2017-05-11
Payer: COMMERCIAL

## 2017-05-12 ENCOUNTER — POSTPARTUM (OUTPATIENT)
Dept: OBGYN CLINIC | Facility: CLINIC | Age: 37
End: 2017-05-12

## 2017-05-12 ENCOUNTER — OFFICE VISIT (OUTPATIENT)
Dept: WOUND CARE | Facility: HOSPITAL | Age: 37
End: 2017-05-12
Attending: NURSE PRACTITIONER
Payer: COMMERCIAL

## 2017-05-12 VITALS
DIASTOLIC BLOOD PRESSURE: 81 MMHG | WEIGHT: 216 LBS | SYSTOLIC BLOOD PRESSURE: 130 MMHG | HEART RATE: 71 BPM | BODY MASS INDEX: 35 KG/M2

## 2017-05-12 PROBLEM — O99.519 ASTHMA AFFECTING PREGNANCY, ANTEPARTUM (HCC): Status: RESOLVED | Noted: 2017-01-17 | Resolved: 2017-03-23

## 2017-05-12 PROBLEM — O16.9 HYPERTENSION AFFECTING PREGNANCY: Status: RESOLVED | Noted: 2017-01-17 | Resolved: 2017-03-23

## 2017-05-12 PROBLEM — R87.610 ATYPICAL SQUAMOUS CELL CHANGES OF UNDETERMINED SIGNIFICANCE (ASCUS) ON CERVICAL CYTOLOGY WITH POSITIVE HIGH RISK HUMAN PAPILLOMA VIRUS (HPV): Status: RESOLVED | Noted: 2017-01-17 | Resolved: 2017-03-23

## 2017-05-12 PROBLEM — R87.810 ATYPICAL SQUAMOUS CELL CHANGES OF UNDETERMINED SIGNIFICANCE (ASCUS) ON CERVICAL CYTOLOGY WITH POSITIVE HIGH RISK HUMAN PAPILLOMA VIRUS (HPV): Status: RESOLVED | Noted: 2017-01-17 | Resolved: 2017-03-23

## 2017-05-12 PROBLEM — O16.9 HYPERTENSION AFFECTING PREGNANCY (HCC): Status: RESOLVED | Noted: 2017-01-17 | Resolved: 2017-03-23

## 2017-05-12 PROBLEM — O99.210 OBESITY AFFECTING PREGNANCY, ANTEPARTUM (HCC): Status: RESOLVED | Noted: 2017-01-17 | Resolved: 2017-03-23

## 2017-05-12 PROBLEM — O99.210 OBESITY AFFECTING PREGNANCY, ANTEPARTUM: Status: RESOLVED | Noted: 2017-01-17 | Resolved: 2017-03-23

## 2017-05-12 PROBLEM — O09.529 AMA (ADVANCED MATERNAL AGE) MULTIGRAVIDA 35+ (HCC): Status: RESOLVED | Noted: 2017-01-17 | Resolved: 2017-03-23

## 2017-05-12 PROBLEM — J45.909 ASTHMA AFFECTING PREGNANCY, ANTEPARTUM (HCC): Status: RESOLVED | Noted: 2017-01-17 | Resolved: 2017-03-23

## 2017-05-12 PROBLEM — O99.519 ASTHMA AFFECTING PREGNANCY, ANTEPARTUM: Status: RESOLVED | Noted: 2017-01-17 | Resolved: 2017-03-23

## 2017-05-12 PROBLEM — J45.909 ASTHMA AFFECTING PREGNANCY, ANTEPARTUM: Status: RESOLVED | Noted: 2017-01-17 | Resolved: 2017-03-23

## 2017-05-12 PROBLEM — O09.529 AMA (ADVANCED MATERNAL AGE) MULTIGRAVIDA 35+: Status: RESOLVED | Noted: 2017-01-17 | Resolved: 2017-03-23

## 2017-05-12 PROCEDURE — 97605 NEG PRS WND THER DME<=50SQCM: CPT

## 2017-05-12 NOTE — PROGRESS NOTES
Subjective    Chief Complaint  This information was obtained from the patient  The patient was seen today for follow up and management of difficult to heal wound(s). 5/12/17 no concerns for the wound vac today.     Allergies  no known allergies    [facility]  Th wound    Diagnoses    ICD-10  O90.2: Hematoma of obstetric wound  O90.0: Disruption of  delivery wound        Plan  Continue with NPWT to continue to fill in the depth of the wound. Treatment Notes Summary  Wound #1 (Abdomen C Section)  . Wound

## 2017-05-16 ENCOUNTER — OFFICE VISIT (OUTPATIENT)
Dept: WOUND CARE | Facility: HOSPITAL | Age: 37
End: 2017-05-16
Attending: NURSE PRACTITIONER
Payer: COMMERCIAL

## 2017-05-16 PROCEDURE — 97605 NEG PRS WND THER DME<=50SQCM: CPT

## 2017-05-16 RX ORDER — ACETAMINOPHEN AND CODEINE PHOSPHATE 120; 12 MG/5ML; MG/5ML
0.35 SOLUTION ORAL DAILY
Qty: 3 PACKAGE | Refills: 1 | Status: SHIPPED | OUTPATIENT
Start: 2017-05-16 | End: 2017-06-13

## 2017-05-16 NOTE — PROGRESS NOTES
Subjective    Chief Complaint  This information was obtained from the patient  The patient was seen today for follow up and management of difficult to heal wound(s). 5/16/17 the wound vac turned off yesterday, she called the help number and it worked.  sh Wound #1 Abdomen C Section is an acute Full Thickness Surgical Wound and has received a status of Not Healed. Subsequent wound encounter measurements are 2cm length x 0.1cm width x 1.6cm depth, with an area of 0.2 sq cm and a volume of 0.32 cubic cm.  There Number of dressings removed from wound: using 1 (1)  Shaved Periwound  NPWT dressing cut to fit wound size: using Granuofoam black (1)  Applied periwound skin protectant(s) using Mastistol (1)  Dressing secured with drape / transparent film.   Drain tubing

## 2017-05-16 NOTE — PROGRESS NOTES
KAYDEN Talley is a 39year old female  here for 6 week post-partum visit. Patient delivered a  female infant on 3/23/17 by emergency  at 28 3/7 weeks for nonreassuring FHR tracing.   Pt then developed a wound cellulitis and subsequ menstrual period 05/02/2017, currently breastfeeding.   General:  Well nourished, well developed woman in no acute distress  Abdomen:  soft, nontender, no masses  External Genitalia: normal appearance, hair distribution, and no lesions  Vagina:  Normal appe

## 2017-05-19 ENCOUNTER — OFFICE VISIT (OUTPATIENT)
Dept: WOUND CARE | Facility: HOSPITAL | Age: 37
End: 2017-05-19
Attending: NURSE PRACTITIONER
Payer: COMMERCIAL

## 2017-05-19 PROCEDURE — 97605 NEG PRS WND THER DME<=50SQCM: CPT

## 2017-05-19 NOTE — PROGRESS NOTES
Subjective    Chief Complaint  This information was obtained from the patient  The patient was seen today for follow up and management of difficult to heal wound(s). 5/16/17 the wound vac turned off yesterday, she called the help number and it worked.  sh appointment to see if anything can be felt.     Active Problems    ICD-10  (Encounter Diagnosis) O90.2 - Hematoma of obstetric wound  (Encounter Diagnosis) O90.0 - Disruption of  delivery wound    Diagnoses    ICD-10  O90.2: Hematoma of obstetric wo

## 2017-05-23 ENCOUNTER — APPOINTMENT (OUTPATIENT)
Dept: WOUND CARE | Facility: HOSPITAL | Age: 37
End: 2017-05-23
Payer: COMMERCIAL

## 2017-05-23 ENCOUNTER — OFFICE VISIT (OUTPATIENT)
Dept: WOUND CARE | Facility: HOSPITAL | Age: 37
End: 2017-05-23
Attending: NURSE PRACTITIONER
Payer: COMMERCIAL

## 2017-05-23 PROCEDURE — 99212 OFFICE O/P EST SF 10 MIN: CPT

## 2017-05-23 NOTE — PROGRESS NOTES
Subjective    Chief Complaint  This information was obtained from the patient  The patient was seen today for follow up and management of difficult to heal wound(s).   5/23/17 no concerns for today's visit.     Allergies  no known allergies    HPI  This inf Wound #1 Abdomen C Section is an acute Full Thickness Surgical Wound and has received a status of Not Healed. Subsequent wound encounter measurements are 0.9cm length x 0.1cm width x 1cm depth, with an area of 0.09 sq cm and a volume of 0.09 cubic cm.  Ther Adhesive remover used: using Adhesive remover wipe (1)

## 2017-05-24 RX ORDER — BREAST PUMP
EACH MISCELLANEOUS
Qty: 1 EACH | Refills: 0 | OUTPATIENT
Start: 2017-05-24

## 2017-05-24 NOTE — TELEPHONE ENCOUNTER
Pt requesting iron refill. Pt delivered with us 3/23/17, PP check 5/23 with [facility].  Routed to [facility] for approval.

## 2017-05-24 NOTE — TELEPHONE ENCOUNTER
Pt delivered with us 3/23/17 and requesting refill on her Labetalol. Please see below. Routed to [facility] on call. OK for refill or defer to PCP? Thanks.

## 2017-05-24 NOTE — TELEPHONE ENCOUNTER
From: Mimi Cobos  To: Allie Barlow DO  Sent: 5/24/2017 3:01 PM CDT  Subject: Medication Renewal Request    Original authorizing provider: DO Mimi Álvarez would like a refill of the following medications:  Misc.  Device

## 2017-05-26 ENCOUNTER — OFFICE VISIT (OUTPATIENT)
Dept: WOUND CARE | Facility: HOSPITAL | Age: 37
End: 2017-05-26
Attending: NURSE PRACTITIONER
Payer: COMMERCIAL

## 2017-05-26 PROCEDURE — 97605 NEG PRS WND THER DME<=50SQCM: CPT

## 2017-05-26 RX ORDER — FERROUS SULFATE 325(65) MG
TABLET ORAL
Qty: 30 TABLET | Refills: 0 | Status: SHIPPED | OUTPATIENT
Start: 2017-05-26

## 2017-05-26 NOTE — PROGRESS NOTES
Subjective    Chief Complaint  This information was obtained from the patient  The patient was seen today for follow up and management of difficult to heal wound(s).   5/26/17 \"I think i want the vac back on. I feel nervous about having it off. \"    Laisha Patel Hematoma of obstetric wound  O90.0: Disruption of  delivery wound        Plan  Continue with NPWT. Treatment Notes Summary  Wound #1 (Abdomen C Section)  . Wound Treatment Note  Cleansed wound and periwound with non-cytotoxic agent.  using 0.9% no

## 2017-05-30 ENCOUNTER — OFFICE VISIT (OUTPATIENT)
Dept: WOUND CARE | Facility: HOSPITAL | Age: 37
End: 2017-05-30
Attending: NURSE PRACTITIONER
Payer: COMMERCIAL

## 2017-05-30 PROCEDURE — 97605 NEG PRS WND THER DME<=50SQCM: CPT

## 2017-05-30 NOTE — PROGRESS NOTES
Subjective    Chief Complaint  This information was obtained from the patient  The patient was seen today for follow up and management of difficult to heal wound(s).   5/26/17 \"I think i want the vac back on. I feel nervous about having it off. \"    Román Jones Wound #1 Abdomen C Section is an acute Full Thickness Surgical Wound and has received a status of Not Healed. Subsequent wound encounter measurements are 1cm length x 0.1cm width x 1cm depth, with an area of 0.1 sq cm and a volume of 0.1 cubic cm.  There is Dressing secured with drape / transparent film.   Drain tubing attached: using TRAC pad (1)  Negative pressure setting: using 125 mmHg (1)  NPWT pressure type: using Continuous (1)  Patient tolerated treatment well without abnormal reactions  Time spent (un

## 2017-06-02 ENCOUNTER — OFFICE VISIT (OUTPATIENT)
Dept: WOUND CARE | Facility: HOSPITAL | Age: 37
End: 2017-06-02
Attending: NURSE PRACTITIONER
Payer: COMMERCIAL

## 2017-06-02 PROCEDURE — 97605 NEG PRS WND THER DME<=50SQCM: CPT

## 2017-06-02 NOTE — PROGRESS NOTES
Subjective    Chief Complaint  This information was obtained from the patient  The patient was seen today for follow up and management of difficult to heal wound(s).   5/26/17 \"I think i want the vac back on. I feel nervous about having it off. \"    Lolis Gunter control.   Active Problems    ICD-10  (Encounter Diagnosis) O90.2 - Hematoma of obstetric wound  (Encounter Diagnosis) O90.0 - Disruption of  delivery wound    Diagnoses    ICD-10  O90.2: Hematoma of obstetric wound  O90.0: Disruption of  de

## 2017-06-06 ENCOUNTER — APPOINTMENT (OUTPATIENT)
Dept: WOUND CARE | Facility: HOSPITAL | Age: 37
End: 2017-06-06
Attending: NURSE PRACTITIONER
Payer: COMMERCIAL

## 2017-06-06 PROCEDURE — 99212 OFFICE O/P EST SF 10 MIN: CPT

## 2017-06-08 NOTE — PROGRESS NOTES
Subjective    Chief Complaint  This information was obtained from the patient  The patient was seen today for follow up and management of difficult to heal wound(s). 6/6/17 patient states her wound vac was going off last night.     Allergies  no known all Wound #1 Abdomen C Section is an acute Full Thickness Surgical Wound and has received a status of Not Healed. Subsequent wound encounter measurements are 0.5cm length x 0.1cm width x 0.5cm depth, with an area of 0.05 sq cm and a volume of 0.025 cubic cm.  Samella Marrow

## 2017-06-09 ENCOUNTER — OFFICE VISIT (OUTPATIENT)
Dept: WOUND CARE | Facility: HOSPITAL | Age: 37
End: 2017-06-09
Attending: NURSE PRACTITIONER
Payer: COMMERCIAL

## 2017-06-09 PROCEDURE — 99211 OFF/OP EST MAY X REQ PHY/QHP: CPT

## 2017-06-09 NOTE — PROGRESS NOTES
Subjective    Chief Complaint  This information was obtained from the patient  The patient was seen today for follow up and management of difficult to heal wound(s). 6/6/17 patient states her wound vac was going off last night.     General Notes:  no conc pain of level 0/10. Wound bed is % epithelialization. The wound is improving. The periwound skin texture is normal. The periwound skin moisture is normal. The periwound skin color is normal. The temperature of the periwound skin is WNL.  Periwound s

## 2017-06-13 ENCOUNTER — APPOINTMENT (OUTPATIENT)
Dept: WOUND CARE | Facility: HOSPITAL | Age: 37
End: 2017-06-13
Payer: COMMERCIAL

## 2017-06-16 ENCOUNTER — APPOINTMENT (OUTPATIENT)
Dept: WOUND CARE | Facility: HOSPITAL | Age: 37
End: 2017-06-16
Payer: COMMERCIAL

## 2017-06-20 ENCOUNTER — APPOINTMENT (OUTPATIENT)
Dept: WOUND CARE | Facility: HOSPITAL | Age: 37
End: 2017-06-20
Payer: COMMERCIAL

## 2017-09-07 ENCOUNTER — PATIENT MESSAGE (OUTPATIENT)
Dept: OBGYN CLINIC | Facility: CLINIC | Age: 37
End: 2017-09-07

## 2017-09-07 NOTE — TELEPHONE ENCOUNTER
From: Luis Kwan  To: Vadim Muhammad DO  Sent: 9/7/2017 9:40 AM CDT  Subject: Other    I have a Wic form that need to be completed its the [facility] medical screening form.  I will be picking up a form for my daughter today and i was wondering if someo

## 2017-09-07 NOTE — TELEPHONE ENCOUNTER
Pt dropped off forms to be completed. Placed in nurses bin.   Please fax to: [phone]  Thank  Laurel Gleason

## 2017-09-08 NOTE — TELEPHONE ENCOUNTER
Form filled out and faxed back to number below. Pt to let us know if she wants to pick it up or if she prefers we shred it. Copy sent to scanning. Original in fax bin.

## 2019-08-21 ENCOUNTER — HOSPITAL ENCOUNTER (EMERGENCY)
Facility: HOSPITAL | Age: 39
Discharge: HOME | End: 2019-08-21
Payer: MEDICAID

## 2019-08-21 ENCOUNTER — APPOINTMENT (OUTPATIENT)
Dept: GENERAL RADIOLOGY | Facility: HOSPITAL | Age: 39
End: 2019-08-21
Attending: NURSE PRACTITIONER
Payer: MEDICAID

## 2019-08-21 VITALS
SYSTOLIC BLOOD PRESSURE: 144 MMHG | DIASTOLIC BLOOD PRESSURE: 83 MMHG | WEIGHT: 238 LBS | BODY MASS INDEX: 37.35 KG/M2 | TEMPERATURE: 99 F | RESPIRATION RATE: 22 BRPM | OXYGEN SATURATION: 95 % | HEART RATE: 93 BPM | HEIGHT: 67 IN

## 2019-08-21 DIAGNOSIS — M54.50 LOW BACK PAIN WITH RADIATION: Primary | ICD-10-CM

## 2019-08-21 DIAGNOSIS — S76.212A GROIN STRAIN, LEFT, INITIAL ENCOUNTER: ICD-10-CM

## 2019-08-21 PROCEDURE — 73502 X-RAY EXAM HIP UNI 2-3 VIEWS: CPT | Performed by: NURSE PRACTITIONER

## 2019-08-21 PROCEDURE — 99283 EMERGENCY DEPT VISIT LOW MDM: CPT

## 2019-08-21 PROCEDURE — 96372 THER/PROPH/DIAG INJ SC/IM: CPT

## 2019-08-21 RX ORDER — METHYLPREDNISOLONE 4 MG/1
TABLET ORAL
Qty: 1 PACKAGE | Refills: 0 | Status: SHIPPED | OUTPATIENT
Start: 2019-08-21

## 2019-08-21 RX ORDER — KETOROLAC TROMETHAMINE 30 MG/ML
60 INJECTION, SOLUTION INTRAMUSCULAR; INTRAVENOUS ONCE
Status: COMPLETED | OUTPATIENT
Start: 2019-08-21 | End: 2019-08-21

## 2019-08-21 RX ORDER — CYCLOBENZAPRINE HCL 10 MG
10 TABLET ORAL 3 TIMES DAILY PRN
Qty: 14 TABLET | Refills: 0 | Status: SHIPPED | OUTPATIENT
Start: 2019-08-21 | End: 2019-08-28

## 2019-08-21 RX ORDER — DIAZEPAM 5 MG/1
5 TABLET ORAL ONCE
Status: COMPLETED | OUTPATIENT
Start: 2019-08-21 | End: 2019-08-21

## 2019-08-21 NOTE — ED PROVIDER NOTES
Patient Seen in: [facility] AND [facility] Emergency Department    History   Patient presents with:  Lower Extremity Injury (musculoskeletal)    Stated Complaint: left leg injury     HPI    44-year-old female presents to the emergency department complaining of p Review of Systems    Positive for stated complaint: left leg injury   Other systems are as noted in HPI. Constitutional and vital signs reviewed. All other systems reviewed and negative except as noted above.     Physical Exam     ED Triage Yuliya arise and to return to the ER for new, worsening or any persistent conditions. I've explained the importance of following up with their doctor as instructed.  The patient verbalized understanding of the discharge instructions and plan                    Dis

## 2019-08-21 NOTE — ED INITIAL ASSESSMENT (HPI)
C/O Atraumatic pain to her L hip into her knee since yest. Reports doing pelvic thrust a few days ago. Pain with weight baring.

## 2019-08-21 NOTE — ED NOTES
Patient arrives with complaints of atraumatic L hip pain x 1 day. Patient states she has been doing pelvic thrusts the last four days that might have contributed to pain. Denies numbness or tingling. Patient states pain increases with walking.  Patient stat

## 2023-03-10 ENCOUNTER — WALK IN (OUTPATIENT)
Dept: URGENT CARE | Age: 43
End: 2023-03-10

## 2023-03-10 VITALS
RESPIRATION RATE: 16 BRPM | WEIGHT: 242 LBS | TEMPERATURE: 98.2 F | HEART RATE: 90 BPM | HEIGHT: 66 IN | BODY MASS INDEX: 38.89 KG/M2 | DIASTOLIC BLOOD PRESSURE: 100 MMHG | SYSTOLIC BLOOD PRESSURE: 150 MMHG | OXYGEN SATURATION: 97 %

## 2023-03-10 DIAGNOSIS — I10 ELEVATED BLOOD PRESSURE READING WITH DIAGNOSIS OF HYPERTENSION: ICD-10-CM

## 2023-03-10 DIAGNOSIS — H66.93 BILATERAL ACUTE OTITIS MEDIA: Primary | ICD-10-CM

## 2023-03-10 PROBLEM — H65.03 NON-RECURRENT ACUTE SEROUS OTITIS MEDIA OF BOTH EARS: Status: ACTIVE | Noted: 2023-03-10

## 2023-03-10 PROCEDURE — 3080F DIAST BP >= 90 MM HG: CPT | Performed by: NURSE PRACTITIONER

## 2023-03-10 PROCEDURE — 3077F SYST BP >= 140 MM HG: CPT | Performed by: NURSE PRACTITIONER

## 2023-03-10 PROCEDURE — 99213 OFFICE O/P EST LOW 20 MIN: CPT | Performed by: NURSE PRACTITIONER

## 2023-03-10 RX ORDER — METFORMIN HYDROCHLORIDE EXTENDED-RELEASE TABLETS 500 MG/1
3 TABLET, FILM COATED, EXTENDED RELEASE ORAL DAILY
COMMUNITY

## 2023-03-10 RX ORDER — ESCITALOPRAM OXALATE 5 MG/1
1 TABLET ORAL DAILY
COMMUNITY
Start: 2022-12-30

## 2023-03-10 RX ORDER — AZITHROMYCIN 250 MG/1
TABLET, FILM COATED ORAL
Qty: 6 TABLET | Refills: 0 | Status: SHIPPED | OUTPATIENT
Start: 2023-03-10 | End: 2023-03-15

## 2023-03-10 RX ORDER — LABETALOL 100 MG/1
100 TABLET, FILM COATED ORAL
COMMUNITY

## 2023-03-10 ASSESSMENT — ENCOUNTER SYMPTOMS
DIZZINESS: 0
DIAPHORESIS: 0
COUGH: 1
SORE THROAT: 0
HEADACHES: 1
CHILLS: 0
DIARRHEA: 0
SHORTNESS OF BREATH: 0
VOMITING: 0
WHEEZING: 0
ABDOMINAL PAIN: 0
FATIGUE: 0
FEVER: 0
RHINORRHEA: 1
CHEST TIGHTNESS: 0

## 2023-03-10 ASSESSMENT — PAIN SCALES - GENERAL: PAINLEVEL: 7

## 2023-05-30 ENCOUNTER — HOSPITAL ENCOUNTER (EMERGENCY)
Facility: HOSPITAL | Age: 43
Discharge: HOME | End: 2023-05-30
Attending: EMERGENCY MEDICINE
Payer: COMMERCIAL

## 2023-05-30 ENCOUNTER — APPOINTMENT (OUTPATIENT)
Dept: CT IMAGING | Facility: HOSPITAL | Age: 43
End: 2023-05-30
Attending: EMERGENCY MEDICINE
Payer: COMMERCIAL

## 2023-05-30 VITALS
HEART RATE: 64 BPM | TEMPERATURE: 99 F | RESPIRATION RATE: 18 BRPM | SYSTOLIC BLOOD PRESSURE: 139 MMHG | OXYGEN SATURATION: 98 % | DIASTOLIC BLOOD PRESSURE: 73 MMHG

## 2023-05-30 DIAGNOSIS — N20.1 LEFT URETERAL STONE: Primary | ICD-10-CM

## 2023-05-30 LAB
ANION GAP SERPL CALC-SCNC: 6 MMOL/L (ref 0–18)
B-HCG UR QL: NEGATIVE
BASOPHILS # BLD AUTO: 0.05 X10(3) UL (ref 0–0.2)
BASOPHILS NFR BLD AUTO: 0.6 %
BILIRUB UR QL: NEGATIVE
BUN BLD-MCNC: 16 MG/DL (ref 7–18)
BUN/CREAT SERPL: 22.5 (ref 10–20)
CALCIUM BLD-MCNC: 9.2 MG/DL (ref 8.5–10.1)
CHLORIDE SERPL-SCNC: 105 MMOL/L (ref 98–112)
CO2 SERPL-SCNC: 24 MMOL/L (ref 21–32)
CREAT BLD-MCNC: 0.71 MG/DL
DEPRECATED RDW RBC AUTO: 44.2 FL (ref 35.1–46.3)
EOSINOPHIL # BLD AUTO: 0.05 X10(3) UL (ref 0–0.7)
EOSINOPHIL NFR BLD AUTO: 0.6 %
ERYTHROCYTE [DISTWIDTH] IN BLOOD BY AUTOMATED COUNT: 14.1 % (ref 11–15)
GFR SERPLBLD BASED ON 1.73 SQ M-ARVRAT: 109 ML/MIN/1.73M2 (ref 60–?)
GLUCOSE BLD-MCNC: 264 MG/DL (ref 70–99)
GLUCOSE UR-MCNC: 1000 MG/DL
HCT VFR BLD AUTO: 38.3 %
HGB BLD-MCNC: 12.6 G/DL
HYALINE CASTS #/AREA URNS AUTO: PRESENT /LPF
IMM GRANULOCYTES # BLD AUTO: 0.02 X10(3) UL (ref 0–1)
IMM GRANULOCYTES NFR BLD: 0.2 %
KETONES UR-MCNC: 20 MG/DL
LEUKOCYTE ESTERASE UR QL STRIP.AUTO: 75
LYMPHOCYTES # BLD AUTO: 2.99 X10(3) UL (ref 1–4)
LYMPHOCYTES NFR BLD AUTO: 33.7 %
MCH RBC QN AUTO: 28.6 PG (ref 26–34)
MCHC RBC AUTO-ENTMCNC: 32.9 G/DL (ref 31–37)
MCV RBC AUTO: 86.8 FL
MONOCYTES # BLD AUTO: 0.62 X10(3) UL (ref 0.1–1)
MONOCYTES NFR BLD AUTO: 7 %
NEUTROPHILS # BLD AUTO: 5.13 X10 (3) UL (ref 1.5–7.7)
NEUTROPHILS # BLD AUTO: 5.13 X10(3) UL (ref 1.5–7.7)
NEUTROPHILS NFR BLD AUTO: 57.9 %
NITRITE UR QL STRIP.AUTO: NEGATIVE
OSMOLALITY SERPL CALC.SUM OF ELEC: 290 MOSM/KG (ref 275–295)
PH UR: 7 [PH] (ref 5–8)
PLATELET # BLD AUTO: 248 10(3)UL (ref 150–450)
POTASSIUM SERPL-SCNC: 4.3 MMOL/L (ref 3.5–5.1)
PROT UR-MCNC: 20 MG/DL
RBC # BLD AUTO: 4.41 X10(6)UL
RBC #/AREA URNS AUTO: >10 /HPF
SODIUM SERPL-SCNC: 135 MMOL/L (ref 136–145)
SP GR UR STRIP: 1.02 (ref 1–1.03)
UROBILINOGEN UR STRIP-ACNC: NORMAL
WBC # BLD AUTO: 8.9 X10(3) UL (ref 4–11)

## 2023-05-30 PROCEDURE — 87086 URINE CULTURE/COLONY COUNT: CPT | Performed by: EMERGENCY MEDICINE

## 2023-05-30 PROCEDURE — 99285 EMERGENCY DEPT VISIT HI MDM: CPT

## 2023-05-30 PROCEDURE — 96374 THER/PROPH/DIAG INJ IV PUSH: CPT

## 2023-05-30 PROCEDURE — 74177 CT ABD & PELVIS W/CONTRAST: CPT | Performed by: EMERGENCY MEDICINE

## 2023-05-30 PROCEDURE — 81001 URINALYSIS AUTO W/SCOPE: CPT | Performed by: EMERGENCY MEDICINE

## 2023-05-30 PROCEDURE — 81025 URINE PREGNANCY TEST: CPT

## 2023-05-30 PROCEDURE — 80048 BASIC METABOLIC PNL TOTAL CA: CPT | Performed by: EMERGENCY MEDICINE

## 2023-05-30 PROCEDURE — 85025 COMPLETE CBC W/AUTO DIFF WBC: CPT | Performed by: EMERGENCY MEDICINE

## 2023-05-30 PROCEDURE — 99284 EMERGENCY DEPT VISIT MOD MDM: CPT

## 2023-05-30 PROCEDURE — 96361 HYDRATE IV INFUSION ADD-ON: CPT

## 2023-05-30 PROCEDURE — 96375 TX/PRO/DX INJ NEW DRUG ADDON: CPT

## 2023-05-30 RX ORDER — KETOROLAC TROMETHAMINE 15 MG/ML
15 INJECTION, SOLUTION INTRAMUSCULAR; INTRAVENOUS ONCE
Status: COMPLETED | OUTPATIENT
Start: 2023-05-30 | End: 2023-05-30

## 2023-05-30 RX ORDER — ONDANSETRON 4 MG/1
4 TABLET, ORALLY DISINTEGRATING ORAL EVERY 4 HOURS PRN
Qty: 10 TABLET | Refills: 0 | Status: SHIPPED | OUTPATIENT
Start: 2023-05-30 | End: 2023-06-06

## 2023-05-30 RX ORDER — TAMSULOSIN HYDROCHLORIDE 0.4 MG/1
0.4 CAPSULE ORAL DAILY
Qty: 14 CAPSULE | Refills: 0 | Status: SHIPPED | OUTPATIENT
Start: 2023-05-30 | End: 2023-06-13

## 2023-05-30 RX ORDER — ONDANSETRON 2 MG/ML
4 INJECTION INTRAMUSCULAR; INTRAVENOUS ONCE
Status: COMPLETED | OUTPATIENT
Start: 2023-05-30 | End: 2023-05-30

## 2023-05-30 RX ORDER — KETOROLAC TROMETHAMINE 10 MG/1
10 TABLET, FILM COATED ORAL EVERY 6 HOURS PRN
Qty: 30 TABLET | Refills: 0 | Status: SHIPPED | OUTPATIENT
Start: 2023-05-30 | End: 2023-06-06

## 2023-05-30 RX ORDER — SODIUM CHLORIDE 9 MG/ML
125 INJECTION, SOLUTION INTRAVENOUS CONTINUOUS
Status: DISCONTINUED | OUTPATIENT
Start: 2023-05-30 | End: 2023-05-30

## 2023-05-30 NOTE — DISCHARGE INSTRUCTIONS
You should return to the emergency department if you develop fever, severe nausea and vomiting and are unable to keep liquids down, you developed severe back/flank or stomach pain, or if your symptoms are not clearly improving at home.

## 2023-05-30 NOTE — ED INITIAL ASSESSMENT (HPI)
Patient to ed via private vehicle co of left lower quadrant pain with vomiting and stated \" I pooped like 7 times\"

## 2024-08-18 ENCOUNTER — HOSPITAL ENCOUNTER (OUTPATIENT)
Age: 44
Discharge: HOME | End: 2024-08-18
Payer: COMMERCIAL

## 2024-08-18 VITALS
TEMPERATURE: 98 F | RESPIRATION RATE: 17 BRPM | OXYGEN SATURATION: 98 % | SYSTOLIC BLOOD PRESSURE: 141 MMHG | DIASTOLIC BLOOD PRESSURE: 89 MMHG | HEART RATE: 83 BPM

## 2024-08-18 DIAGNOSIS — R31.9 URINARY TRACT INFECTION WITH HEMATURIA, SITE UNSPECIFIED: Primary | ICD-10-CM

## 2024-08-18 DIAGNOSIS — N39.0 URINARY TRACT INFECTION WITH HEMATURIA, SITE UNSPECIFIED: Primary | ICD-10-CM

## 2024-08-18 LAB
B-HCG UR QL: NEGATIVE
GLUCOSE UR STRIP-MCNC: 100 MG/DL
LEUKOCYTE ESTERASE UR QL STRIP: NEGATIVE
NITRITE UR QL STRIP: POSITIVE
PH UR STRIP: 6 [PH]
PROT UR STRIP-MCNC: 100 MG/DL
SP GR UR STRIP: 1.02
UROBILINOGEN UR STRIP-ACNC: <2 MG/DL

## 2024-08-18 PROCEDURE — 87086 URINE CULTURE/COLONY COUNT: CPT | Performed by: PHYSICIAN ASSISTANT

## 2024-08-18 PROCEDURE — 87077 CULTURE AEROBIC IDENTIFY: CPT | Performed by: PHYSICIAN ASSISTANT

## 2024-08-18 RX ORDER — LOSARTAN POTASSIUM 50 MG/1
50 TABLET ORAL DAILY
COMMUNITY
Start: 2024-07-25

## 2024-08-18 RX ORDER — ESCITALOPRAM OXALATE 5 MG/1
5 TABLET ORAL DAILY
COMMUNITY
Start: 2022-12-30

## 2024-08-18 RX ORDER — METFORMIN HYDROCHLORIDE 500 MG/1
2000 TABLET, EXTENDED RELEASE ORAL EVERY EVENING
COMMUNITY

## 2024-08-18 RX ORDER — NITROFURANTOIN 25; 75 MG/1; MG/1
100 CAPSULE ORAL 2 TIMES DAILY
Qty: 10 CAPSULE | Refills: 0 | Status: SHIPPED | OUTPATIENT
Start: 2024-08-18 | End: 2024-08-23

## 2024-08-18 RX ORDER — OMEPRAZOLE 40 MG/1
40 CAPSULE, DELAYED RELEASE ORAL DAILY
COMMUNITY
Start: 2024-06-04

## 2024-08-18 RX ORDER — FLUCONAZOLE 150 MG/1
150 TABLET ORAL ONCE
Qty: 1 TABLET | Refills: 0 | Status: SHIPPED | OUTPATIENT
Start: 2024-08-18 | End: 2024-08-18

## 2024-08-18 NOTE — ED INITIAL ASSESSMENT (HPI)
Pt here with complaints of dysuria, urinary frequency and urgency that began 5 days ago , pt denies any fevers or abd/flank pain

## 2024-08-18 NOTE — ED PROVIDER NOTES
Patient Seen in: [facility]      History     Chief Complaint   Patient presents with    Urinary Symptoms     Stated Complaint: Urinary Symptoms    Subjective:   HPI    Patient is a 44-year-old female with past medical history of hypertension diabetes that presents to immediate care due to dysuria urinary frequency and urgency x 5 days.  States that over the past few days she was on vacation and felt that she was mildly dehydrated.  Denies abdominal pain flank pain fever chills.  Has been using over-the-counter Pyridium with significant relief.    Objective:   No pertinent past medical history.            No pertinent past surgical history.              No pertinent social history.            Review of Systems    Positive for stated Chief Complaint: Urinary Symptoms    Other systems are as noted in HPI.  Constitutional and vital signs reviewed.      All other systems reviewed and negative except as noted above.    Physical Exam     ED Triage Vitals [08/18/24 0934]   /89   Pulse 83   Resp 17   Temp 97.9 °F (36.6 °C)   Temp src Temporal   SpO2 98 %   O2 Device None (Room air)       Current Vitals:   Vital Signs  BP: 141/89  Pulse: 83  Resp: 17  Temp: 97.9 °F (36.6 °C)  Temp src: Temporal    Oxygen Therapy  SpO2: 98 %  O2 Device: None (Room air)            Physical Exam    Vital signs reviewed. Nursing note reviewed.  Constitutional: Well-developed. Well-nourished. In no acute distress  HENT: Mucous membranes moist.   EYES: No scleral icterus or conjunctival injection.  NECK: Full ROM. Supple.   CARDIAC: Normal rate. Normal S1/ S2. 2+ distal pulses. No edema  PULM/CHEST: Clear to auscultation bilaterally. No wheezes  ABD: Soft, non-tender, non-distended.   : No CVA tenderness.  RECTAL: deferred  Extremities: Full ROM  NEURO: Awake, alert, following commands, moving extremities, answering questions.   SKIN: Warm and dry. No rash or lesions.  PSYCH: Normal judgment. Normal affect.        ED Course      Labs Reviewed   [facility] POCT URINALYSIS DIPSTICK - Abnormal; Notable for the following components:       Result Value    Urine Color Maddi (*)     Urine Clarity Slightly cloudy (*)     Protein urine 100 (*)     Glucose, Urine 100 (*)     Ketone, Urine Trace (*)     Bilirubin, Urine Small (*)     Blood, Urine Moderate (*)     Nitrite Urine Positive (*)     All other components within normal limits   POCT PREGNANCY URINE - Normal   URINE CULTURE, ROUTINE                      MDM      Patient is a 44-year-old female with PMH of HTN DM that presents to immediate care due to dysuria x 5 day.  Patient arrives with stable vitals sitting comfortably.  Physical exam showing no abdominal tenderness, or CVA tenderness.  Most likely uncomplicated urinary tract infection.  Urine pregnancy negative.  Will treat with Macrobid twice daily for 5 days.  Less likely pyelonephritis or nephrolithiasis.  Less likely STIs as patient denies known exposure.  History given by patient.  Return protocols discussed including worsening pain abdominal pain fever hematuria.  Patient agreeable to plan all questions answered.                                     Medical Decision Making      Disposition and Plan     Clinical Impression:  1. Urinary tract infection with hematuria, site unspecified         Disposition:  Discharge  8/18/2024 10:11 am    Follow-up:  No follow-up provider specified.        Medications Prescribed:  Current Discharge Medication List        START taking these medications    Details   nitrofurantoin monohydrate macro 100 MG Oral Cap Take 1 capsule (100 mg total) by mouth 2 (two) times daily for 5 days.  Qty: 10 capsule, Refills: 0      fluconazole (DIFLUCAN) 150 MG Oral Tab Take 1 tablet (150 mg total) by mouth once for 1 dose.  Qty: 1 tablet, Refills: 0

## 2025-01-14 ENCOUNTER — HOSPITAL ENCOUNTER (OUTPATIENT)
Age: 45
Discharge: HOME | End: 2025-01-14
Payer: COMMERCIAL

## 2025-01-14 VITALS
SYSTOLIC BLOOD PRESSURE: 112 MMHG | DIASTOLIC BLOOD PRESSURE: 90 MMHG | OXYGEN SATURATION: 98 % | TEMPERATURE: 98 F | RESPIRATION RATE: 20 BRPM | HEART RATE: 97 BPM

## 2025-01-14 DIAGNOSIS — H92.02 LEFT EAR PAIN: ICD-10-CM

## 2025-01-14 DIAGNOSIS — J02.9 SORE THROAT: ICD-10-CM

## 2025-01-14 DIAGNOSIS — J06.9 UPPER RESPIRATORY TRACT INFECTION, UNSPECIFIED TYPE: Primary | ICD-10-CM

## 2025-01-14 LAB — S PYO AG THROAT QL: NEGATIVE

## 2025-01-14 PROCEDURE — 99213 OFFICE O/P EST LOW 20 MIN: CPT | Performed by: EMERGENCY MEDICINE

## 2025-01-14 PROCEDURE — 87880 STREP A ASSAY W/OPTIC: CPT | Performed by: EMERGENCY MEDICINE

## 2025-01-14 NOTE — ED INITIAL ASSESSMENT (HPI)
Pt states Saturday pt began having symptoms, pt states Sunday night began having throat pain. Pt states having cough and congestion. Pt denies fevers.

## 2025-01-14 NOTE — DISCHARGE INSTRUCTIONS
TheraFlu, or Mucinex for symptoms.  There is Tylenol in it so do not double dose yourself.  Primary care follow-up if symptoms not better within the next week, go to the ER for any new or worsening symptoms.

## 2025-01-14 NOTE — ED PROVIDER NOTES
Patient Seen in: [facility]      History     Chief Complaint   Patient presents with    Sore Throat     Stated Complaint: sore throat, ear pain    Subjective:   HPI    Ashley Davis is a 44 year old female here for sore throat, left ear pain, and cough that started Saturday.      Objective:     No pertinent past medical history.            No pertinent past surgical history.              No pertinent social history.            Review of Systems    Positive for stated complaint: sore throat, ear pain  Other systems are as noted in HPI.  Constitutional and vital signs reviewed.      All other systems reviewed and negative except as noted above.    Physical Exam     ED Triage Vitals [01/14/25 0900]   /90   Pulse 97   Resp 20   Temp 98.4 °F (36.9 °C)   Temp src Oral   SpO2 98 %   O2 Device None (Room air)       Current Vitals:   Vital Signs  BP: 112/90  Pulse: 97  Resp: 20  Temp: 98.4 °F (36.9 °C)  Temp src: Oral    Oxygen Therapy  SpO2: 98 %  O2 Device: None (Room air)        Physical Exam  Vitals and nursing note reviewed.   Constitutional:       Appearance: Normal appearance. She is not ill-appearing.   HENT:      Head: Normocephalic.      Right Ear: Tympanic membrane, ear canal and external ear normal.      Left Ear: Ear canal and external ear normal.      Ears:      Comments: Small effusion left TM without bulge, erythema/injection.     Nose: Congestion present.      Mouth/Throat:      Mouth: Mucous membranes are moist.      Pharynx: No oropharyngeal exudate or uvula swelling.      Tonsils: 2+ on the right. 2+ on the left.      Comments: Injected posterior pharynx without or petechiae.  Not beefy red in nature.  No exudates.  Eyes:      Conjunctiva/sclera: Conjunctivae normal.      Pupils: Pupils are equal, round, and reactive to light.   Cardiovascular:      Rate and Rhythm: Normal rate.      Pulses: Normal pulses.   Pulmonary:      Effort: Pulmonary effort is normal.      Breath sounds:  Normal breath sounds.   Musculoskeletal:      Cervical back: Normal range of motion. No erythema or rigidity. No pain with movement, spinous process tenderness or muscular tenderness. Normal range of motion.   Lymphadenopathy:      Head:      Right side of head: No tonsillar adenopathy.      Left side of head: No tonsillar adenopathy.      Cervical: Cervical adenopathy present.      Right cervical: No superficial, deep or posterior cervical adenopathy.     Left cervical: Superficial cervical adenopathy present. No deep or posterior cervical adenopathy.   Skin:     General: Skin is warm.      Capillary Refill: Capillary refill takes less than 2 seconds.      Findings: No lesion or rash.   Neurological:      General: No focal deficit present.      Mental Status: She is alert and oriented to person, place, and time.   Psychiatric:         Mood and Affect: Mood normal.         Behavior: Behavior normal.         Thought Content: Thought content normal.         Judgment: Judgment normal.             ED Course     Labs Reviewed   POCT RAPID STREP - Normal                   MDM           Medical Decision Making  Ddx: URI vs LRI, allergies, reactive, COVID, FLU, RSV, or somatic causes of symptoms    Treat for viral upper respiratory infection, and left earache.. Supportive care include but not limited to otc cold medications if there is no contraindication, cool mist humidifier, and oral hydration.  Avoid dairy if possible; This increases mucus production.  Antibiotics do not treat symptoms, or viral illnesses; They are not indicated at this time.    No stridor, No hot muffled speech, and no signs of compromise. Tolerating PO. Neuro wnl.   Reinforced ER precautions, and f/u care as needed. All questions answered, and reassurance given. No acute distress and cleared for home.        Problems Addressed:  Left ear pain: acute illness or injury  Sore throat: acute illness or injury  Upper respiratory tract infection, unspecified  type: acute illness or injury    Amount and/or Complexity of Data Reviewed  External Data Reviewed: notes.  Labs: ordered. Decision-making details documented in ED Course.     Details: Independent interpretation. Reviewed with patient    Risk  OTC drugs.  Prescription drug management.        Disposition and Plan     Clinical Impression:  1. Upper respiratory tract infection, unspecified type    2. Sore throat    3. Left ear pain         Disposition:  Discharge  1/14/2025  9:35 am    Follow-up:  Nonstaff, Physician                Medications Prescribed:  Current Discharge Medication List              Supplementary Documentation:

## 2025-03-10 ENCOUNTER — HOSPITAL ENCOUNTER (EMERGENCY)
Facility: HOSPITAL | Age: 45
Discharge: HOME | End: 2025-03-10
Attending: STUDENT IN AN ORGANIZED HEALTH CARE EDUCATION/TRAINING PROGRAM
Payer: MEDICAID

## 2025-03-10 ENCOUNTER — APPOINTMENT (OUTPATIENT)
Dept: CT IMAGING | Facility: HOSPITAL | Age: 45
End: 2025-03-10
Attending: STUDENT IN AN ORGANIZED HEALTH CARE EDUCATION/TRAINING PROGRAM
Payer: MEDICAID

## 2025-03-10 VITALS
TEMPERATURE: 97 F | RESPIRATION RATE: 16 BRPM | HEIGHT: 66 IN | OXYGEN SATURATION: 95 % | DIASTOLIC BLOOD PRESSURE: 99 MMHG | HEART RATE: 83 BPM | WEIGHT: 240 LBS | SYSTOLIC BLOOD PRESSURE: 129 MMHG | BODY MASS INDEX: 38.57 KG/M2

## 2025-03-10 DIAGNOSIS — K57.92 ACUTE DIVERTICULITIS: Primary | ICD-10-CM

## 2025-03-10 LAB
ALBUMIN SERPL-MCNC: 4.1 G/DL (ref 3.2–4.8)
ALBUMIN/GLOB SERPL: 1.2 {RATIO} (ref 1–2)
ALP LIVER SERPL-CCNC: 87 U/L
ALT SERPL-CCNC: 84 U/L
ANION GAP SERPL CALC-SCNC: 8 MMOL/L (ref 0–18)
AST SERPL-CCNC: 80 U/L (ref ?–34)
B-HCG UR QL: NEGATIVE
BASOPHILS # BLD AUTO: 0.09 X10(3) UL (ref 0–0.2)
BASOPHILS NFR BLD AUTO: 1.1 %
BILIRUB SERPL-MCNC: 0.5 MG/DL (ref 0.3–1.2)
BILIRUB UR QL: NEGATIVE
BUN BLD-MCNC: 8 MG/DL (ref 9–23)
BUN/CREAT SERPL: 13.8 (ref 10–20)
CALCIUM BLD-MCNC: 8.6 MG/DL (ref 8.7–10.4)
CHLORIDE SERPL-SCNC: 105 MMOL/L (ref 98–112)
CLARITY UR: CLEAR
CO2 SERPL-SCNC: 23 MMOL/L (ref 21–32)
COLOR UR: YELLOW
CREAT BLD-MCNC: 0.58 MG/DL
DEPRECATED RDW RBC AUTO: 44.1 FL (ref 35.1–46.3)
EGFRCR SERPLBLD CKD-EPI 2021: 114 ML/MIN/1.73M2 (ref 60–?)
EOSINOPHIL # BLD AUTO: 0.1 X10(3) UL (ref 0–0.7)
EOSINOPHIL NFR BLD AUTO: 1.3 %
ERYTHROCYTE [DISTWIDTH] IN BLOOD BY AUTOMATED COUNT: 14.4 % (ref 11–15)
GLOBULIN PLAS-MCNC: 3.3 G/DL (ref 2–3.5)
GLUCOSE BLD-MCNC: 168 MG/DL (ref 70–99)
GLUCOSE UR-MCNC: 30 MG/DL
HCT VFR BLD AUTO: 36.7 %
HGB BLD-MCNC: 11.9 G/DL
HGB UR QL STRIP.AUTO: NEGATIVE
IMM GRANULOCYTES # BLD AUTO: 0.02 X10(3) UL (ref 0–1)
IMM GRANULOCYTES NFR BLD: 0.3 %
KETONES UR-MCNC: NEGATIVE MG/DL
LEUKOCYTE ESTERASE UR QL STRIP.AUTO: 75
LIPASE SERPL-CCNC: 31 U/L (ref 12–53)
LYMPHOCYTES # BLD AUTO: 3.61 X10(3) UL (ref 1–4)
LYMPHOCYTES NFR BLD AUTO: 45.6 %
MCH RBC QN AUTO: 27.2 PG (ref 26–34)
MCHC RBC AUTO-ENTMCNC: 32.4 G/DL (ref 31–37)
MCV RBC AUTO: 83.8 FL
MONOCYTES # BLD AUTO: 0.56 X10(3) UL (ref 0.1–1)
MONOCYTES NFR BLD AUTO: 7.1 %
NEUTROPHILS # BLD AUTO: 3.54 X10 (3) UL (ref 1.5–7.7)
NEUTROPHILS # BLD AUTO: 3.54 X10(3) UL (ref 1.5–7.7)
NEUTROPHILS NFR BLD AUTO: 44.6 %
NITRITE UR QL STRIP.AUTO: NEGATIVE
OSMOLALITY SERPL CALC.SUM OF ELEC: 284 MOSM/KG (ref 275–295)
PH UR: 7 [PH] (ref 5–8)
PLATELET # BLD AUTO: 317 10(3)UL (ref 150–450)
POTASSIUM SERPL-SCNC: 4.4 MMOL/L (ref 3.5–5.1)
PROT SERPL-MCNC: 7.4 G/DL (ref 5.7–8.2)
RBC # BLD AUTO: 4.38 X10(6)UL
SODIUM SERPL-SCNC: 136 MMOL/L (ref 136–145)
SP GR UR STRIP: 1.02 (ref 1–1.03)
UROBILINOGEN UR STRIP-ACNC: NORMAL
WBC # BLD AUTO: 7.9 X10(3) UL (ref 4–11)

## 2025-03-10 PROCEDURE — 83690 ASSAY OF LIPASE: CPT

## 2025-03-10 PROCEDURE — 80053 COMPREHEN METABOLIC PANEL: CPT | Performed by: STUDENT IN AN ORGANIZED HEALTH CARE EDUCATION/TRAINING PROGRAM

## 2025-03-10 PROCEDURE — 83690 ASSAY OF LIPASE: CPT | Performed by: STUDENT IN AN ORGANIZED HEALTH CARE EDUCATION/TRAINING PROGRAM

## 2025-03-10 PROCEDURE — S0028 INJECTION, FAMOTIDINE, 20 MG: HCPCS | Performed by: STUDENT IN AN ORGANIZED HEALTH CARE EDUCATION/TRAINING PROGRAM

## 2025-03-10 PROCEDURE — 81001 URINALYSIS AUTO W/SCOPE: CPT | Performed by: STUDENT IN AN ORGANIZED HEALTH CARE EDUCATION/TRAINING PROGRAM

## 2025-03-10 PROCEDURE — 99284 EMERGENCY DEPT VISIT MOD MDM: CPT

## 2025-03-10 PROCEDURE — 85025 COMPLETE CBC W/AUTO DIFF WBC: CPT | Performed by: STUDENT IN AN ORGANIZED HEALTH CARE EDUCATION/TRAINING PROGRAM

## 2025-03-10 PROCEDURE — 80053 COMPREHEN METABOLIC PANEL: CPT

## 2025-03-10 PROCEDURE — 85025 COMPLETE CBC W/AUTO DIFF WBC: CPT

## 2025-03-10 PROCEDURE — 81025 URINE PREGNANCY TEST: CPT

## 2025-03-10 PROCEDURE — 96374 THER/PROPH/DIAG INJ IV PUSH: CPT

## 2025-03-10 PROCEDURE — 74177 CT ABD & PELVIS W/CONTRAST: CPT | Performed by: STUDENT IN AN ORGANIZED HEALTH CARE EDUCATION/TRAINING PROGRAM

## 2025-03-10 PROCEDURE — 99285 EMERGENCY DEPT VISIT HI MDM: CPT

## 2025-03-10 RX ORDER — FAMOTIDINE 10 MG/ML
20 INJECTION, SOLUTION INTRAVENOUS ONCE
Status: COMPLETED | OUTPATIENT
Start: 2025-03-10 | End: 2025-03-10
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
## 2025-05-19 ENCOUNTER — HOSPITAL ENCOUNTER (OUTPATIENT)
Age: 45
Discharge: HOME | End: 2025-05-19
Payer: MEDICAID

## 2025-05-19 VITALS
HEART RATE: 87 BPM | RESPIRATION RATE: 22 BRPM | DIASTOLIC BLOOD PRESSURE: 81 MMHG | SYSTOLIC BLOOD PRESSURE: 146 MMHG | OXYGEN SATURATION: 99 % | TEMPERATURE: 98 F

## 2025-05-19 DIAGNOSIS — N30.00 ACUTE CYSTITIS WITHOUT HEMATURIA: ICD-10-CM

## 2025-05-19 DIAGNOSIS — N76.0 ACUTE VAGINITIS: Primary | ICD-10-CM

## 2025-05-19 DIAGNOSIS — H92.03 EARACHE SYMPTOMS IN BOTH EARS: ICD-10-CM

## 2025-05-19 DIAGNOSIS — J98.01 BRONCHOSPASM: ICD-10-CM

## 2025-05-19 DIAGNOSIS — J01.90 ACUTE NON-RECURRENT SINUSITIS, UNSPECIFIED LOCATION: ICD-10-CM

## 2025-05-19 DIAGNOSIS — N89.8 VAGINAL DISCHARGE: ICD-10-CM

## 2025-05-19 DIAGNOSIS — H65.91 RIGHT OTITIS MEDIA WITH EFFUSION: ICD-10-CM

## 2025-05-19 DIAGNOSIS — B37.9 YEAST INFECTION: ICD-10-CM

## 2025-05-19 LAB
GLUCOSE UR STRIP-MCNC: 100 MG/DL
HGB UR QL STRIP: NEGATIVE
KETONES UR STRIP-MCNC: 15 MG/DL
NITRITE UR QL STRIP: POSITIVE
PH UR STRIP: 5 [PH]
PROT UR STRIP-MCNC: 100 MG/DL
SP GR UR STRIP: 1.01
UROBILINOGEN UR STRIP-ACNC: 2 MG/DL

## 2025-05-19 PROCEDURE — 99214 OFFICE O/P EST MOD 30 MIN: CPT | Performed by: EMERGENCY MEDICINE

## 2025-05-19 PROCEDURE — 81002 URINALYSIS NONAUTO W/O SCOPE: CPT | Performed by: EMERGENCY MEDICINE

## 2025-05-19 RX ORDER — BENZONATATE 100 MG/1
100 CAPSULE ORAL 3 TIMES DAILY PRN
Qty: 30 CAPSULE | Refills: 0 | Status: SHIPPED | OUTPATIENT
Start: 2025-05-19

## 2025-05-19 RX ORDER — ALBUTEROL SULFATE 90 UG/1
INHALANT RESPIRATORY (INHALATION)
Qty: 1 EACH | Refills: 0 | Status: SHIPPED | OUTPATIENT
Start: 2025-05-19

## 2025-05-19 RX ORDER — FLUCONAZOLE 150 MG/1
TABLET ORAL
Qty: 2 TABLET | Refills: 0 | Status: SHIPPED | OUTPATIENT
Start: 2025-05-19 | End: 2025-05-20

## 2025-05-19 NOTE — ED INITIAL ASSESSMENT (HPI)
Pt presents with cough and congestion with bilateral ear pain x 1.5 weeks. Ear pain started 2 days ago. No fever reported.     Pt also reports urinary urgency, frequency and pain with urination. Vaginal discharge, \"I think I have a yeast infection\", per pt. Symptoms x 24 hours.

## 2025-05-19 NOTE — DISCHARGE INSTRUCTIONS
CAUTION WITH INHALER AND BLOOD PRESSURE MEDS. Pharmacologic effects of beta-2 agonists (eg, Beta-2 Agonists) may be decreased by beta-adrenergic blockers (eg, Beta Blockers). Untoward physiologic effects, characterized by bronchospasm, may occur.     See caution above.  I sent 4 prescriptions to the pharmacy.    Albuterol inhaler coughing spasms,   Augmentin antibiotic for ear infection, sinus infection, and urinary tract infection,   Diflucan for yeast infection, and   Benzonatate for cough.    You can take benzonatate imaging cold medications such as Mucinex.  Do not get any medication with the DM, or S initials on there.  Drink plenty of water, and we will call you with urine culture results, and vaginosis culture results.  All results will be released onto your MyChart.  If there is any change in treatment we will call you.  Left linger for the next 4 to 6 weeks, but should get better every second week.  Go to the ER for any new or worsening symptoms, and call your primary care to let them know what we are treating you for.
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
## 2025-05-20 LAB
BV BACTERIA DNA VAG QL NAA+PROBE: NEGATIVE
C GLABRATA DNA VAG QL NAA+PROBE: POSITIVE
C KRUSEI DNA VAG QL NAA+PROBE: NEGATIVE
C TRACH DNA SPEC QL NAA+PROBE: NEGATIVE
CANDIDA DNA VAG QL NAA+PROBE: POSITIVE
N GONORRHOEA DNA SPEC QL NAA+PROBE: NEGATIVE
T VAGINALIS DNA VAG QL NAA+PROBE: NEGATIVE

## (undated) DEVICE — ABDOMINAL PAD: Brand: CURITY

## (undated) DEVICE — DRESSING SILVERLON 2X12

## (undated) DEVICE — KENDALL SCD EXPRESS SLEEVES, KNEE LENGTH, MEDIUM: Brand: KENDALL SCD

## (undated) DEVICE — NON-ADHERENT PAD PREPACK: Brand: TELFA

## (undated) DEVICE — SUTURE VICRYL 0 J340H

## (undated) DEVICE — MINOR GENERAL: Brand: MEDLINE INDUSTRIES, INC.

## (undated) DEVICE — COVER SGL STRL LGHT HNDL BLU

## (undated) DEVICE — REM POLYHESIVE ADULT PATIENT RETURN ELECTRODE: Brand: VALLEYLAB

## (undated) DEVICE — SPONGE: LAP 18X18 PW 200/CS: Brand: NOVAPLUS®

## (undated) DEVICE — STERILE LATEX POWDER-FREE SURGICAL GLOVESWITH NITRILE COATING: Brand: PROTEXIS

## (undated) DEVICE — KENDALL SCD EXPRESS SLEEVES, KNEE LENGTH, LARGE: Brand: KENDALL SCD

## (undated) DEVICE — 3M™ STERI-STRIP™ REINFORCED ADHESIVE SKIN CLOSURES, R1547, 1/2 IN X 4 IN (12 MM X 100 MM), 6 STRIPS/ENVELOPE: Brand: 3M™ STERI-STRIP™

## (undated) DEVICE — SUCTION CANISTER, 3000CC,SAFELINER: Brand: DEROYAL

## (undated) DEVICE — CULTURE TUBE ANAEROBIC

## (undated) DEVICE — X-STREAM LAPAROSCOPIC IRRIGATION TUBING SET WITH NEZHAT-DORSEY TRUMPET VALVE, AND COJOINED SUCTION/IRRIGATION TUBING, WITHOUT PROBE TIP: Brand: X-STREAM

## (undated) DEVICE — APPLICATOR COTTON TIP 6\" 2/PK

## (undated) DEVICE — 3M™ MEDIPORE™ SOFT CLOTH TAPE, 4 INCH X 10 YARDS, 12 ROLLS/CASE, 2964: Brand: 3M™ MEDIPORE™

## (undated) DEVICE — VIOLET BRAIDED (POLYGLACTIN 910), SYNTHETIC ABSORBABLE SUTURE: Brand: COATED VICRYL

## (undated) DEVICE — SOL  .9 1000ML BTL

## (undated) DEVICE — STERILE POLYISOPRENE POWDER-FREE SURGICAL GLOVES: Brand: PROTEXIS

## (undated) DEVICE — SOL  .9 3000ML

## (undated) DEVICE — TRAY CATH BDX IC 14FR 2L FL

## (undated) DEVICE — TRAY SRGPRP PVP IOD WT SCRB SM

## (undated) DEVICE — SUTURE PLAIN GUT 2-0 CT

## (undated) DEVICE — DRAPE SHEET LAPAROTOMY

## (undated) DEVICE — C SECTION PACK: Brand: MEDLINE INDUSTRIES, INC.

## (undated) NOTE — MR AVS SNAPSHOT
[address] [place] [address]  [phone]               Thank you for choosing us for your health care visit with Fay Sigala PT.   We are glad to serve you and happy to provide you w MyChart     Visit [employer]  You can access your MyChart to more actively manage your health care and view more details from this visit by going to [email or web address].   If you've recently had a stay at the Hospital you can access yo

## (undated) NOTE — MR AVS SNAPSHOT
[address]  [place]  [phone]  [phone]               Thank you for choosing us for your health care visit with Fabio Wan PT.   We are glad to serve you and happy to provide you wi recent med list.                Breast Pump Misc   DOUBLE ELECTRIC BREAST PUMP              docusate sodium 100 MG Caps   Take 100 mg by mouth 2 (two) times daily.    Commonly known as:  COLACE           Ferrous Sulfate 325 (65 Fe) MG Tabs   TAKE 1 TAB

## (undated) NOTE — MR AVS SNAPSHOT
[address].  [place]  [phone]  [phone]               Thank you for choosing us for your health care visit with Real Campos PT.   We are glad to serve you and happy to provide you wi No Known Allergies                   Current Medications          This list is accurate as of: 5/8/17 10:21 AM.  Always use your most recent med list.                Breast Pump Misc   DOUBLE ELECTRIC BREAST PUMP              docusate sodium 100 MG C

## (undated) NOTE — MR AVS SNAPSHOT
[address] [facility]  [phone]               Thank you for choosing us for your health care visit with Kierra Tran PT.   We are glad to serve you and happy to provide you w This list is accurate as of: 5/1/17  1:38 PM.  Always use your most recent med list.                Breast Pump Misc   DOUBLE ELECTRIC BREAST PUMP              docusate sodium 100 MG Caps   Take 100 mg by mouth 2 (two) times daily.    Commonly known as

## (undated) NOTE — MR AVS SNAPSHOT
[address] [place] [address]  [phone]               Thank you for choosing us for your health care visit with Scott Jacob PT.   We are glad to serve you and happy to provide you w TAKE 1 TABLET (325 MG TOTAL) BY MOUTH DAILY WITH BREAKFAST.           ibuprofen 600 MG Tabs   Take 1 tablet (600 mg total) by mouth every 6 (six) hours.    Commonly known as:  MOTRIN           Labetalol HCl 100 MG Tabs   Take 100 mg by mouth 2 (two) times d

## (undated) NOTE — MR AVS SNAPSHOT
[address]  [phone]               Thank you for choosing us for your health care visit with Saran Dewey MD.  We are glad to serve you and happy to provide you with this summa Commonly known as:  Bishop Mcnulty           prenatal multivitamin plus DHA 27-0.8-228 MG Caps   Take 1 capsule by mouth daily. Vitamin D 1000 units Tabs   Take by mouth.            ZYRTEC ALLERGY 10 MG Caps   Generic drug:  Cetirizine HCl   Take by mo

## (undated) NOTE — MR AVS SNAPSHOT
[address]  [place]  [phone]               Thank you for choosing us for your health care visit with Didier Briones PT.   We are glad to serve you and happy to provide you wi This list is accurate as of: 4/24/17 10:14 AM.  Always use your most recent med list.                Breast Pump Misc   DOUBLE ELECTRIC BREAST PUMP              docusate sodium 100 MG Caps   Take 100 mg by mouth 2 (two) times daily.    Commonly known a

## (undated) NOTE — IP AVS SNAPSHOT
[facility]    [address], [place], [place] ~ [phone]                Discharge Summary   4/25/2017    Fátima Zelaya           Admission Information        Provider Department    4/25/2017 DO Ly Baker P feel like your normal self.       We Recommend:   · Do not drive any motor vehicle or bicycle   · Avoid mowing the lawn, playing sports, or working with power tools/applicances (power saws, electric knives or mixers)   · That you have someone stay with you Oly Barnes and your surgeon are proudly participating in the [facility] \"National Surgical quality Improvement Program\" (NSQIP).   Questionnaires will be mailed to randomly selected surgery patients 30 days after their surg for more than 10 seconds at a time many times while you sleep. Another term for this problem is obstructive sleep apnea. Sleep apnea affects between 2 and 10% of people. It is more common in men than in women.  It is also more common in people who are over Your healthcare provider may:  • Ask you about your health history and your family's health history. • Examine you, especially your throat and nasal passages. • Do a sleep study at a sleep disorders clinic or sleep lab.  Your heart rate, brain waves, ches Surgery may be an option if you cannot use the breathing machine regularly and properly. A surgical treatment might include improving the air passage in the nose, removing the tonsils, or moving the back of the tongue forward.       For mild sleep apnea, a Future Appointments        Provider Department    4/27/2017 8:15 AM Rosemary Szymanski    5/1/2017 10:30 AM Obi Mcdaniels    5/3/2017 2:30 PM [facility] AND [facility] C 1 -- (03/29/17)  8.5 (H) (03/29/17)  3.0 (03/29/17)  1.2 (H) (03/29/17)  0.3 (03/29/17)  0.1    (03/24/17)  81 (03/24/17)  11 (03/24/17)  8 (03/24/17)  0 (03/24/17)  0  (03/24/17)  14.2 (H) (03/24/17)  1.9 (03/24/17)  1.4 (H) (03/24/17)  0.0 (03/24/17)  0. We want to hear from you, please share your experience with us by returning the survey you will receive in the mail. Thank you!         MyChart     Visit [employer]  You can access your MyChart to more actively manage your health care and view more details f

## (undated) NOTE — MR AVS SNAPSHOT
[address] [place] [address]  [phone]               Thank you for choosing us for your health care visit with Prerna Avendaño PT.   We are glad to serve you and happy to provide you w Current Medications          This list is accurate as of: 6/2/17 11:54 AM.  Always use your most recent med list.                Breast Pump Misc   DOUBLE ELECTRIC BREAST PUMP              Ferrous Sulfate 325 (65 Fe) MG Tabs   TAKE 1 TABLET (325 MG TO

## (undated) NOTE — LETTER
[address], [place], [place]     AUTHORIZATION FOR SURGICAL OPERATION OR PROCEDURE    1.    I hereby authorize Dr. Augusta Beth DO, my Physician(s) and whomever may be designated as the doctor's Assistant, to perform to have an autologous transfusion of my own blood, or a directed donor transfusion, I will discuss this with my         Physician.   5.   I consent to the photographing of procedure(s) to be performed for the purposes of advancing medicine, science (Responsible person in case of minor or unconscious patient)   (Relationship to Patient)      _______________________________________________________________ ____________________________  (Witness signature)

## (undated) NOTE — LETTER
Date & Time: 1/14/2025, 9:35 AM  Patient: Ashley Davis  Encounter Provider(s):    Yamileth Thomas APRN       To Whom It May Concern:    Ashley Davis was seen and treated in our department on 1/14/2025.  Please excuse from work today 1/14/2025.    LORETTA Chan, 01/14/25, 9:35 AM

## (undated) NOTE — MR AVS SNAPSHOT
[address] [place] [address]  [phone]               Thank you for choosing us for your health care visit with Scott Jacob PT.   We are glad to serve you and happy to provide you w Current Medications          This list is accurate as of: 4/21/17 11:06 AM.  Always use your most recent med list.                Breast Pump Misc   DOUBLE ELECTRIC BREAST PUMP              docusate sodium 100 MG Caps   Take 100 mg by mouth 2 (two) ti

## (undated) NOTE — ED AVS SNAPSHOT
[facility]    [address]     [address] [place] [address]    Phone:  [phone]    Fax:  [phone]           Shanta Hicks   MRN: [id number]    Department:  [facility]   Date of Visit:  3/2 CONTINUE taking these medications     Breast Pump Misc   Quantity:  1 each   DOUBLE ELECTRIC BREAST PUMP               Where to Get Your Medications      These medications were sent to [address] [place] [address], [address] [place] [address] [phone] our [address] at [phone]. Your Emergency Department team is here to serve you. You are our top priority. You were examined and treated today on an urgent basis only. This was not a substitute for ongoing medical care.  Often, one Emergency Dep pertaining to these instructions have been answered in a satisfactory manner. 24-Hour Pharmacies        Pharmacy Address Phone Number   S[address] [facility] ([address]) [address] [facility] ([address] [place] [address] office, you can view your past visit information in [employer] by going to Visits < Visit Summaries. [employer] questions? Call [phone] for help. [employer] is NOT to be used for urgent needs. For medical emergencies, dial 911.

## (undated) NOTE — MR AVS SNAPSHOT
[address] [place] [address]  [phone]               Thank you for choosing us for your health care visit with Grant Daly PT.   We are glad to serve you and happy to provide you w Current Medications          This list is accurate as of: 5/30/17  3:19 PM.  Always use your most recent med list.                Breast Pump Misc   DOUBLE ELECTRIC BREAST PUMP              Ferrous Sulfate 325 (65 Fe) MG Tabs   TAKE 1 TABLET (325 MG T

## (undated) NOTE — MR AVS SNAPSHOT
[address]  [phone]               Thank you for choosing us for your health care visit with Nurse.   We are glad to serve you and happy to provide you with this summary of your vis ibuprofen 600 MG Tabs   Take 1 tablet (600 mg total) by mouth every 6 (six) hours. Commonly known as:  MOTRIN           Labetalol HCl 100 MG Tabs   Take 100 mg by mouth 2 (two) times daily.    Commonly known as:  Yue Kraus           prenatal multivitamin

## (undated) NOTE — MR AVS SNAPSHOT
[address] [place] [address]  [phone]               Thank you for choosing us for your health care visit with Monica Foley PT.   We are glad to serve you and happy to provide you w This list is accurate as of: 5/3/17 11:31 AM.  Always use your most recent med list.                Breast Pump Misc   DOUBLE ELECTRIC BREAST PUMP              docusate sodium 100 MG Caps   Take 100 mg by mouth 2 (two) times daily.    Commonly known as

## (undated) NOTE — IP AVS SNAPSHOT
[address]  [address] [facility] ~ [phone]                Discharge Summary   3/22/2017    Sanaz Fierro           Admission Information        Provider Department    3/22/2017 Latasha Messer MD [facility] 3se Last time this was given:  100 mg on 3/26/2017  9:05 AM   Commonly known as:  NORMODYNE        Take 100 mg by mouth 2 (two) times daily.       [    ]    [    ]    [    ]    [    ]       prenatal multivitamin plus DHA 27-0.8-228 MG Caps        Take 1 capsule · Remember, it can take as long as 6 weeks for a  incision to heal.  Activity  Here are some suggestions:  · Don’t try to take care of anyone other than your baby and yourself.   · Remember, the more active you are, the more likely you are to have Preeclampsia is a serious disease related to high blood pressure (hypertension). Preeclampsia/hypertension can happen during pregnancy and up to 6 weeks following childbirth.   Contact your doctor or midwife immediately if you experience any of the fol 10.9 (L) (03/13/17)  32.9 (L) (03/13/17)  90.3    (03/13/17)  288 (03/13/17)  9.9      Recent Hematology Lab Results (cont.)  (Last 3 results in the past 90 days)    Neutrophil % Lymphocyte % Monocyte % Eosinophil % Basophil % Prelim Neut Abs Final Neut Ab Pain Management Resolved   Fetal Monitoring Resolved   Delivery Process Resolved   Tocolytics Resolved   Antibiotics Resolved   Psychosocial/Spiritual Support Resolved   Community Resources Resolved               Additional Information       Call your doct office, you can view your past visit information in [employer] by going to Visits < Visit Summaries. [employer] questions? Call [phone] for help. [employer] is NOT to be used for urgent needs. For medical emergencies, dial 911.

## (undated) NOTE — MR AVS SNAPSHOT
[address]  [phone]               Thank you for choosing us for your health care visit with Nurse.   We are glad to serve you and happy to provide you with this summary of your vis ZYRTEC ALLERGY 10 MG Caps   Generic drug:  Cetirizine HCl   Take by mouth. MyChart     Visit "[employer]  You can access your MyChart to more actively manage your health care and view more details from this visit by going to [email or web address]

## (undated) NOTE — MR AVS SNAPSHOT
[address]  [phone]               Thank you for choosing us for your health care visit with Lisbeth Moran MD.  We are glad to serve you and happy to provide you with this summa prenatal multivitamin plus DHA 27-0.8-228 MG Caps   Take 1 capsule by mouth daily. Vitamin D 1000 units Tabs   Take by mouth. ZYRTEC ALLERGY 10 MG Caps   Generic drug:  Cetirizine HCl   Take by mouth.                    Jaimie Grajeda

## (undated) NOTE — MR AVS SNAPSHOT
[address]  [place] [address]  [phone]  [phone]               Thank you for choosing us for your health care visit with Yumiko Henry PT.   We are glad to serve you and happy to provide you wi No Known Allergies                   Current Medications          This list is accurate as of: 5/10/17 10:36 AM.  Always use your most recent med list.                Breast Pump Misc   DOUBLE ELECTRIC BREAST PUMP              docusate sodium 100 MG

## (undated) NOTE — MR AVS SNAPSHOT
[address]  [place]  [phone]  [phone]               Thank you for choosing us for your health care visit with Son Fuchs PT.   We are glad to serve you and happy to provide you wi Current Medications          This list is accurate as of: 5/5/17 11:00 AM.  Always use your most recent med list.                Breast Pump Misc   DOUBLE ELECTRIC BREAST PUMP              docusate sodium 100 MG Caps   Take 100 mg by mouth 2 (two) theodore

## (undated) NOTE — MR AVS SNAPSHOT
[address] [place] [address]  [phone]               Thank you for choosing us for your health care visit with Susan Perez PT.   We are glad to serve you and happy to provide you w Current Medications          This list is accurate as of: 5/16/17 11:11 AM.  Always use your most recent med list.                Breast Pump Misc   DOUBLE ELECTRIC BREAST PUMP              Ferrous Sulfate 325 (65 Fe) MG Tabs   TAKE 1 TABLET (325 MG T

## (undated) NOTE — MR AVS SNAPSHOT
Jesse  [address]  [phone]               Thank you for choosing us for your health care visit with Nurse.   We are glad to serve you and happy to provide you with this summary of your vis Assoc Dx:  Encounter for supervision of normal first pregnancy in second trimester [Z34.02]           HCV Antibody [E]    Complete by:  Jan 12, 2017 (Approximate)    Assoc Dx:  Encounter for supervision of normal first pregnancy in second trimester [Z34. 0 Choose whole grain products Foods high in sodium   Water is best for hydration Fast food.    Eat at home when possible     Tips for increasing your physical activity – Adults who are physically active are less likely to develop some chronic diseases than ad

## (undated) NOTE — MR AVS SNAPSHOT
[address] [place] [address]  [phone]               Thank you for choosing us for your health care visit with Maximino Curry, PT.   We are glad to serve you and happy to provide you w Current Medications          This list is accurate as of: 5/12/17 10:59 AM.  Always use your most recent med list.                Breast Pump Misc   DOUBLE ELECTRIC BREAST PUMP              docusate sodium 100 MG Caps   Take 100 mg by mouth 2 (two) ti

## (undated) NOTE — Clinical Note
MAJOR CASE PREOPERATIVE INSTRUCTIONS    4/21/2017      Dear Nidhi  are having a wound exploration and application of wound vac of the abdomen on 4/25/17 at 8:30am.    Do not eat or drink anything (including water) after midnight the night befor

## (undated) NOTE — ED AVS SNAPSHOT
Kina Haley   MRN: [id number]    Department:  [facility] Emergency Department   Date of Visit:  8/21/2019           Disclosure     Insurance plans vary and the physician(s) referred by the ER may not be covered by your plan.  Please contact CARE PHYSICIAN AT ONCE OR RETURN IMMEDIATELY TO THE EMERGENCY DEPARTMENT. If you have been prescribed any medication(s), please fill your prescription right away and begin taking the medication(s) as directed.   If you believe that any of the medications

## (undated) NOTE — MR AVS SNAPSHOT
[address] [place] [address]  [phone]               Thank you for choosing us for your health care visit with Susan Perez PT.   We are glad to serve you and happy to provide you w Current Medications          This list is accurate as of: 5/23/17 10:57 AM.  Always use your most recent med list.                Breast Pump Misc   DOUBLE ELECTRIC BREAST PUMP              Ferrous Sulfate 325 (65 Fe) MG Tabs   TAKE 1 TABLET (325 MG T

## (undated) NOTE — MR AVS SNAPSHOT
[address] [place] [address]  [phone]               Thank you for choosing us for your health care visit with Natan Robert PT.   We are glad to serve you and happy to provide you w following  section, postpartum [O90.2]                 Reason for Today's Visit     Wound Recheck           Medical Issues Discussed Today     Non-healing surgical wound    -  Primary      Instructions and Information about Your Health     None Call [phone] for help. [employer] is NOT to be used for urgent needs. For medical emergencies, dial 911.            Visit [facility] online at  [email or web address]

## (undated) NOTE — MR AVS SNAPSHOT
[address] [place] [address]  [phone]               Thank you for choosing us for your health care visit with Lore Gamino PT.   We are glad to serve you and happy to provide you w Current Medications          This list is accurate as of: 5/26/17 11:30 AM.  Always use your most recent med list.                Breast Pump Misc   DOUBLE ELECTRIC BREAST PUMP              Ferrous Sulfate 325 (65 Fe) MG Tabs   TAKE 1 TABLET (325 MG T

## (undated) NOTE — MR AVS SNAPSHOT
Jesse  [address]  [phone]               Thank you for choosing us for your health care visit with Anuradha Shah MD.  We are glad to serve you and happy to provide you with this summa Labetalol HCl 100 MG Tabs   Take 100 mg by mouth 2 (two) times daily. Commonly known as:  Stevo Jung           prenatal multivitamin plus DHA 27-0.8-228 MG Caps   Take 1 capsule by mouth daily. Vitamin D 1000 units Tabs   Take by mouth.

## (undated) NOTE — Clinical Note
INSTRUCTIONS FOR PRE-SURGICAL   ANTIMICROBIAL BATH/SHOWER    Your doctor has recommended a pre-surgical CHG (chlorhexidine gluconate) shower/bath with Betasept (also sold as Hibiclens). It reduces bacteria that can potentially cause infection.   Betasept Keep out of reach of children. If swallowed get medica help or contact [employer]. Store between 60-80 degrees F. [employer] Warning! CHG WILL STAIN YOUR FABRICS! Use with care around shower curtains, towels washcloths rugs and clothes.   Wipe

## (undated) NOTE — MR AVS SNAPSHOT
[address] [place] [address]  [phone]               Thank you for choosing us for your health care visit with Jeri Ahuja PT.   We are glad to serve you and happy to provide you w Take 1 tablet by mouth every 6 (six) hours as needed. Commonly known as:  NORCO           ibuprofen 600 MG Tabs   Take 1 tablet (600 mg total) by mouth every 6 (six) hours.    Commonly known as:  MOTRIN           Labetalol HCl 100 MG Tabs   Take 100 mg by

## (undated) NOTE — ED AVS SNAPSHOT
[facility] Emergency Department    [address]     [address] [place] [address]    Phone:  [phone]    Fax:  [phone]           Evelyn Nichols   MRN: [id number]    Department:  [facility] Emergency Department   Date of Visit:  3/2 and Class Registration line at [phone] or find a doctor online by visiting [email or web address].    IF THERE IS ANY CHANGE OR WORSENING OF YOUR CONDITION, CALL YOUR PRIMARY CARE PHYSICIAN AT ONCE OR RETURN IMMEDIATELY TO [address] [place] [address].     If